# Patient Record
Sex: FEMALE | Race: WHITE | NOT HISPANIC OR LATINO | Employment: UNEMPLOYED | URBAN - METROPOLITAN AREA
[De-identification: names, ages, dates, MRNs, and addresses within clinical notes are randomized per-mention and may not be internally consistent; named-entity substitution may affect disease eponyms.]

---

## 2017-04-24 ENCOUNTER — OFFICE VISIT (OUTPATIENT)
Dept: URGENT CARE | Facility: CLINIC | Age: 37
End: 2017-04-24
Payer: COMMERCIAL

## 2017-04-24 ENCOUNTER — HOSPITAL ENCOUNTER (OUTPATIENT)
Dept: RADIOLOGY | Facility: HOSPITAL | Age: 37
Discharge: HOME | End: 2017-04-24
Attending: NURSE PRACTITIONER
Payer: COMMERCIAL

## 2017-04-24 VITALS
RESPIRATION RATE: 16 BRPM | DIASTOLIC BLOOD PRESSURE: 70 MMHG | TEMPERATURE: 99 F | SYSTOLIC BLOOD PRESSURE: 116 MMHG | HEIGHT: 63 IN | WEIGHT: 184.31 LBS | BODY MASS INDEX: 32.66 KG/M2 | HEART RATE: 96 BPM | OXYGEN SATURATION: 99 %

## 2017-04-24 DIAGNOSIS — S69.91XA HAND INJURY, RIGHT, INITIAL ENCOUNTER: Primary | ICD-10-CM

## 2017-04-24 DIAGNOSIS — S69.91XA HAND INJURY, RIGHT, INITIAL ENCOUNTER: ICD-10-CM

## 2017-04-24 PROCEDURE — 1160F RVW MEDS BY RX/DR IN RCRD: CPT | Mod: S$GLB,,, | Performed by: NURSE PRACTITIONER

## 2017-04-24 PROCEDURE — 73130 X-RAY EXAM OF HAND: CPT | Mod: 26,RT,, | Performed by: RADIOLOGY

## 2017-04-24 PROCEDURE — 99999 PR PBB SHADOW E&M-NEW PATIENT-LVL III: CPT | Mod: PBBFAC,,, | Performed by: NURSE PRACTITIONER

## 2017-04-24 PROCEDURE — 73130 X-RAY EXAM OF HAND: CPT | Mod: TC,PO,RT

## 2017-04-24 PROCEDURE — 99203 OFFICE O/P NEW LOW 30 MIN: CPT | Mod: S$GLB,,, | Performed by: NURSE PRACTITIONER

## 2017-04-24 NOTE — MR AVS SNAPSHOT
"    [facility]  [address] [place] [address]  Phone: [phone]  Fax: [phone]                  Vero Morrissey   2017 7:40 PM   Office Visit    Description:  Female : 1980   Provider:  DEISY Elaine   Department:  [facility]           Reason for Visit     Hand Injury           Diagnoses this Visit        Comments    Hand injury, right, initial encounter    -  Primary            To Do List           Goals (5 Years of Data)     None      [facility]     [facility] On Call Nurse Care Line -  Assistance  Unless otherwise directed by your provider, please contact [facility], our nurse care line that is available for  assistance.     Registered nurses in the [facility] provide: appointment scheduling, clinical advisement, health education, and other advisory services.  Call: [phone] (toll free)               Medications           Message regarding Medications     Verify the changes and/or additions to your medication regime listed below are the same as discussed with your clinician today.  If any of these changes or additions are incorrect, please notify your healthcare provider.             Verify that the below list of medications is an accurate representation of the medications you are currently taking.  If none reported, the list may be blank. If incorrect, please contact your healthcare provider. Carry this list with you in case of emergency.                Clinical Reference Information           Your Vitals Were     BP Pulse Temp Resp Height    116/70 (BP Location: Left arm, Patient Position: Sitting, BP Method: Manual) 96 98.5 °F (36.9 °C) (Tympanic) 16 5' 3" (1.6 m)    Weight Last Period SpO2 BMI    83.6 kg (184 lb 4.9 oz) 2017 99% 32.65 kg/m2      Blood Pressure          Most Recent Value    BP  116/70      Allergies as of 2017     No Known Allergies      Immunizations Administered on Date " of Encounter - 4/24/2017     None      Orders Placed During Today's Visit     Future Labs/Procedures Expected by Expires    X-Ray Hand 3 view Right  4/24/2017 4/24/2018      [facility] Sign-Up     Activating your [facility] account is as easy as 1-2-3!     1) Visit [email or web address], select Sign Up Now, enter this activation code and your date of birth, then select Next.  [email or web address]  Expires: 6/8/2017  7:51 PM      2) Create a username and password to use when you visit [facility] in the future and select a security question in case you lose your password and select Next.    3) Enter your e-mail address and click Sign Up!    Additional Information  If you have questions, please e-mail [email or web address] or call [phone] to talk to our [facility] staff. Remember, [facility] is NOT to be used for urgent needs. For medical emergencies, dial 911.         Instructions      RICE     Rest an injury, elevate it, and use ice and compression as directed.   RICE stands for rest, ice, compression, and elevation. These can limit pain and swelling after an injury. RICE may be recommended to help treat fractures, sprains, strains, and bruises or bumps.   Home care  The following explain the details of RICE:  · Rest. Limit the use of the injured body part. This helps prevent further damage to the body part and gives it time to heal. In some cases, you may need a sling, brace, splint, or cast to help keep the body part still until it has healed.  · Ice. Applying ice right after an injury helps relieve pain and swelling. Wrap a bag of ice in a thin towel. Then, place it over the injured area. Do this for 10 to 15 minutes every 3 to 4 hours. Continue for the next 1 to 3 days or until your symptoms improve. Never put ice directly on your skin or ice an area longer than 15 minutes at a time.  · Compression. Putting pressure on an injury helps reduce swelling and provides support. Wrap the injured area firmly with an elastic  bandage/wrap. Make sure not to wrap the bandage too tightly or you will cut off blood flow to the injured area. If your bandage loosens, rewrap it.  · Elevation. Keeping an injury raised above the level of your heart reduces swelling, pain, and throbbing. For instance, if you have a broken leg, it may help to rest your leg on several pillows when sitting or lying down. Try to keep the injured area elevated for at least 2 to 3 hours per day.  Follow-up care  Follow up with your healthcare provider, or as advised.  When to seek medical advice  Call your healthcare provider right away if any of these occur:  · Fever of 100.4°F (38°C) or higher, or as directed by your healthcare provider  · Increased pain or swelling in the injured body part  · Injured body part becomes cold, blue, numb, or tingly  · Signs of infection. These include warmth in the skin, redness, drainage, or bad smell coming from the injured body part.  Date Last Reviewed: 1/18/2016 © 2000-2016 [employer]. [address] [place] [address]. All rights reserved. This information is not intended as a substitute for professional medical care. Always follow your healthcare professional's instructions.        Wrist Sprain  A sprain is an injury to the ligaments or capsule that holds a joint together. There are no broken bones. Most sprains take about 3 to 6 weeks to heal. If it a severe sprain where the ligament is completely torn, it can take months to recover.    Most wrist sprains are treated with a splint, wrist brace, or elastic wrap for support. Severe sprains may require surgery.  Home care  · Keep your arm elevated to reduce pain and swelling. This is very important during the first 48 hours.  · Apply an ice pack over the injured area for 15 to 20 minutes every 3 to 6 hours. You should do this for the first 24 to 48 hours. You can make an ice pack by filling a plastic bag that seals at the top with ice cubes and then wrapping  it with a thin towel. Continue to use ice packs for relief of pain and swelling as needed. As the ice melts, be careful to avoid getting your wrap, splint, or cast wet. After 48 hours, apply heat (warm shower or warm bath) for 15 to 20 minutes several times a day, or alternate ice and heat.   · You may use over-the-counter pain medicine to control pain, unless another pain medicine was prescribed. If you have chronic liver or kidney disease or ever had a stomach ulcer or GI bleeding, talk with your doctor before using these medicines.  · If you were given a splint or brace, wear it for the time advised by your doctor.  Follow-up care  Follow up with your healthcare provider as advised. Any X-rays you had today dont show any broken bones, breaks, or fractures. Sometimes fractures dont show up on the first X-ray. Bruises and sprains can sometimes hurt as much as a fracture. These injuries can take time to heal completely. If your symptoms dont improve or they get worse, talk with your doctor. You may need a repeat X-ray. If X-rays were taken, you will be told of any new findings that may affect your care.  When to seek medical advice  Call your healthcare provider right away if any of these occur:  · Pain or swelling increases  · Fingers or hand becomes cold, blue, numb, or tingly  Date Last Reviewed: 11/20/2015  © 8388-5812 [employer]. [address] [place] [address]. All rights reserved. This information is not intended as a substitute for professional medical care. Always follow your healthcare professional's instructions.        Wrist Splint: Velcro  A splint is designed to prevent movement of the bones, muscles and tendons. Velcro wrist splints are used because of their comfort and convenience for wrist and hand injuries. In certain conditions, the splint can be removed when bathing or changing clothes. The condition you are being treated for will determine how long you should wear the  splint and if it is safe to remove your splint before your next visit. If you are unsure, ask your nurse or doctor.  When to seek medical advice  Call your healthcare provider right away if any of these occur:  · Increased pain or swelling under the splint or in the hand or fingers  · Fingers or hand becomes cold, blue, numb or tingly  Date Last Reviewed: 11/21/2015  © 3172-3662 [employer]. [address] [place] [address], [place] [address]. All rights reserved. This information is not intended as a substitute for professional medical care. Always follow your healthcare professional's instructions.             Smoking Cessation     If you would like to quit smoking:   You may be eligible for free services if you are a [place] resident and started smoking cigarettes before September 1, 1988.  Call the Smoking Cessation Trust ([facility]) toll free at (653) 644-3972 or (019) 468-1611.   Call 1-800-QUIT-NOW if you do not meet the above criteria.   Contact us via email: [email or web address]   View our website for more information: [email or web address]        Language Assistance Services     ATTENTION: Language assistance services are available, free of charge. Please call [phone].      ATENCIÓN: Si habla salañol, tiene a riggs disposición servicios gratuitos de asistencia lingüística. Llame al [phone].     CHÚ Ý: N?u b?n nói Ti?ng Vi?t, có các d?ch v? h? tr? ngôn ng? mi?n phí dành cho b?n. G?i s? [phone].         [facility] complies with applicable Federal civil rights laws and does not discriminate on the basis of race, color, national origin, age, disability, or sex.

## 2017-04-25 NOTE — PATIENT INSTRUCTIONS
RICE     Rest an injury, elevate it, and use ice and compression as directed.   RICE stands for rest, ice, compression, and elevation. These can limit pain and swelling after an injury. RICE may be recommended to help treat fractures, sprains, strains, and bruises or bumps.   Home care  The following explain the details of RICE:  · Rest. Limit the use of the injured body part. This helps prevent further damage to the body part and gives it time to heal. In some cases, you may need a sling, brace, splint, or cast to help keep the body part still until it has healed.  · Ice. Applying ice right after an injury helps relieve pain and swelling. Wrap a bag of ice in a thin towel. Then, place it over the injured area. Do this for 10 to 15 minutes every 3 to 4 hours. Continue for the next 1 to 3 days or until your symptoms improve. Never put ice directly on your skin or ice an area longer than 15 minutes at a time.  · Compression. Putting pressure on an injury helps reduce swelling and provides support. Wrap the injured area firmly with an elastic bandage/wrap. Make sure not to wrap the bandage too tightly or you will cut off blood flow to the injured area. If your bandage loosens, rewrap it.  · Elevation. Keeping an injury raised above the level of your heart reduces swelling, pain, and throbbing. For instance, if you have a broken leg, it may help to rest your leg on several pillows when sitting or lying down. Try to keep the injured area elevated for at least 2 to 3 hours per day.  Follow-up care  Follow up with your healthcare provider, or as advised.  When to seek medical advice  Call your healthcare provider right away if any of these occur:  · Fever of 100.4°F (38°C) or higher, or as directed by your healthcare provider  · Increased pain or swelling in the injured body part  · Injured body part becomes cold, blue, numb, or tingly  · Signs of infection. These include warmth in the skin, redness, drainage, or bad  smell coming from the injured body part.  Date Last Reviewed: 1/18/2016  © 0634-3669 [employer]. [address] [place] [address], [place] [address]. All rights reserved. This information is not intended as a substitute for professional medical care. Always follow your healthcare professional's instructions.        Wrist Sprain  A sprain is an injury to the ligaments or capsule that holds a joint together. There are no broken bones. Most sprains take about 3 to 6 weeks to heal. If it a severe sprain where the ligament is completely torn, it can take months to recover.    Most wrist sprains are treated with a splint, wrist brace, or elastic wrap for support. Severe sprains may require surgery.  Home care  · Keep your arm elevated to reduce pain and swelling. This is very important during the first 48 hours.  · Apply an ice pack over the injured area for 15 to 20 minutes every 3 to 6 hours. You should do this for the first 24 to 48 hours. You can make an ice pack by filling a plastic bag that seals at the top with ice cubes and then wrapping it with a thin towel. Continue to use ice packs for relief of pain and swelling as needed. As the ice melts, be careful to avoid getting your wrap, splint, or cast wet. After 48 hours, apply heat (warm shower or warm bath) for 15 to 20 minutes several times a day, or alternate ice and heat.   · You may use over-the-counter pain medicine to control pain, unless another pain medicine was prescribed. If you have chronic liver or kidney disease or ever had a stomach ulcer or GI bleeding, talk with your doctor before using these medicines.  · If you were given a splint or brace, wear it for the time advised by your doctor.  Follow-up care  Follow up with your healthcare provider as advised. Any X-rays you had today dont show any broken bones, breaks, or fractures. Sometimes fractures dont show up on the first X-ray. Bruises and sprains can sometimes hurt as much as a fracture.  These injuries can take time to heal completely. If your symptoms dont improve or they get worse, talk with your doctor. You may need a repeat X-ray. If X-rays were taken, you will be told of any new findings that may affect your care.  When to seek medical advice  Call your healthcare provider right away if any of these occur:  · Pain or swelling increases  · Fingers or hand becomes cold, blue, numb, or tingly  Date Last Reviewed: 11/20/2015 © 2000-2016 [employer]. [address] [place] [address]. All rights reserved. This information is not intended as a substitute for professional medical care. Always follow your healthcare professional's instructions.        Wrist Splint: Velcro  A splint is designed to prevent movement of the bones, muscles and tendons. Velcro wrist splints are used because of their comfort and convenience for wrist and hand injuries. In certain conditions, the splint can be removed when bathing or changing clothes. The condition you are being treated for will determine how long you should wear the splint and if it is safe to remove your splint before your next visit. If you are unsure, ask your nurse or doctor.  When to seek medical advice  Call your healthcare provider right away if any of these occur:  · Increased pain or swelling under the splint or in the hand or fingers  · Fingers or hand becomes cold, blue, numb or tingly  Date Last Reviewed: 11/21/2015 © 2000-2016 [employer]. [address] [place] [address]. All rights reserved. This information is not intended as a substitute for professional medical care. Always follow your healthcare professional's instructions.

## 2017-04-27 NOTE — PROGRESS NOTES
Subjective:       Patient ID: Vero Morrissey is a 36 y.o. female.    Chief Complaint: Hand Injury    Hand Injury    Her dominant hand is their right hand. The incident occurred 1 to 3 hours ago. The incident occurred in the yard. The injury mechanism was a fall. The pain is present in the right hand. The quality of the pain is described as aching. The pain does not radiate. The pain is at a severity of 5/10. The pain has been constant since the incident. Pertinent negatives include no numbness or tingling. The symptoms are aggravated by movement and palpation. She has tried NSAIDs for the symptoms. The treatment provided mild relief.     Review of Systems   Constitutional: Negative for fever.   Respiratory: Negative for shortness of breath.    Gastrointestinal: Negative for nausea and vomiting.   Musculoskeletal: Positive for joint swelling.   Skin: Negative for color change and rash.   Allergic/Immunologic: Negative for immunocompromised state.   Neurological: Negative for tingling and numbness.   Hematological: Does not bruise/bleed easily.       Objective:      Physical Exam   Constitutional: She is oriented to person, place, and time. She appears well-developed and well-nourished.   Eyes: Conjunctivae are normal.   Neck: Normal range of motion.   Cardiovascular: Normal rate.    Pulmonary/Chest: Effort normal. No respiratory distress.   Musculoskeletal: She exhibits edema and tenderness.        Right hand: She exhibits tenderness, bony tenderness and swelling. She exhibits normal range of motion, normal capillary refill and no laceration.   There is mild swelling below the right thumb. NV intact.   Neurological: She is alert and oriented to person, place, and time.   Skin: Skin is warm and dry.   Psychiatric: She has a normal mood and affect. Her behavior is normal.   Vitals reviewed.      Assessment:       1. Hand injury, right, initial encounter        Plan:   Vero was seen today for hand  injury.    Diagnoses and all orders for this visit:    Hand injury, right, initial encounter  -     X-Ray Hand 3 view Right; Future    Discussed PRICE therapy:  Protect the joint with stabilizing brace or taping  Rest the extremity  Ice as many times a day for 20 minute intervals for first 48 hours or until inflammation has stabilized   Compression to provide support and help prevent swelling  Elevation above heart level to help decrease swelling    For pain, may take Ibuprofen  X-ray looks normal but will call after radiology read.  If symptoms worsen or fail to improve, follow-up with primary care doctor or nearest ER. After visit summary given and discussed. Patient verbalized understanding and agrees with treatment plan. Patient remained stable and was discharged in no acute distress.

## 2021-04-29 ENCOUNTER — PATIENT MESSAGE (OUTPATIENT)
Dept: RESEARCH | Facility: HOSPITAL | Age: 41
End: 2021-04-29

## 2023-08-01 ENCOUNTER — OFFICE VISIT (OUTPATIENT)
Dept: PODIATRY | Facility: CLINIC | Age: 43
End: 2023-08-01
Payer: COMMERCIAL

## 2023-08-01 VITALS — HEIGHT: 63 IN | BODY MASS INDEX: 32.6 KG/M2 | WEIGHT: 184 LBS

## 2023-08-01 DIAGNOSIS — B07.0 PLANTAR WART OF LEFT FOOT: Primary | ICD-10-CM

## 2023-08-01 PROCEDURE — 3008F PR BODY MASS INDEX (BMI) DOCUMENTED: ICD-10-PCS | Mod: CPTII,S$GLB,, | Performed by: PODIATRIST

## 2023-08-01 PROCEDURE — 1159F PR MEDICATION LIST DOCUMENTED IN MEDICAL RECORD: ICD-10-PCS | Mod: CPTII,S$GLB,, | Performed by: PODIATRIST

## 2023-08-01 PROCEDURE — 3008F BODY MASS INDEX DOCD: CPT | Mod: CPTII,S$GLB,, | Performed by: PODIATRIST

## 2023-08-01 PROCEDURE — 1159F MED LIST DOCD IN RCRD: CPT | Mod: CPTII,S$GLB,, | Performed by: PODIATRIST

## 2023-08-01 PROCEDURE — 17000 PR DESTRUCTION(LASER SURGERY,CRYOSURGERY,CHEMOSURGERY),PREMALIGNANT LESIONS,FIRST LESION: ICD-10-PCS | Mod: S$GLB,,, | Performed by: PODIATRIST

## 2023-08-01 PROCEDURE — 17000 DESTRUCT PREMALG LESION: CPT | Mod: S$GLB,,, | Performed by: PODIATRIST

## 2023-08-01 PROCEDURE — 99204 OFFICE O/P NEW MOD 45 MIN: CPT | Mod: 25,S$GLB,, | Performed by: PODIATRIST

## 2023-08-01 PROCEDURE — 99999 PR PBB SHADOW E&M-EST. PATIENT-LVL III: ICD-10-PCS | Mod: PBBFAC,,, | Performed by: PODIATRIST

## 2023-08-01 PROCEDURE — 99204 PR OFFICE/OUTPT VISIT, NEW, LEVL IV, 45-59 MIN: ICD-10-PCS | Mod: 25,S$GLB,, | Performed by: PODIATRIST

## 2023-08-01 PROCEDURE — 99999 PR PBB SHADOW E&M-EST. PATIENT-LVL III: CPT | Mod: PBBFAC,,, | Performed by: PODIATRIST

## 2023-08-01 RX ORDER — DEXTROAMPHETAMINE SACCHARATE, AMPHETAMINE ASPARTATE, DEXTROAMPHETAMINE SULFATE AND AMPHETAMINE SULFATE 7.5; 7.5; 7.5; 7.5 MG/1; MG/1; MG/1; MG/1
1 TABLET ORAL 2 TIMES DAILY
COMMUNITY
Start: 2023-07-14

## 2023-08-01 RX ORDER — ZINC GLUCONATE 13.3 MG
200 LOZENGE ORAL 4 TIMES DAILY
Qty: 120 TABLET | Refills: 0 | Status: SHIPPED | OUTPATIENT
Start: 2023-08-01 | End: 2023-11-16

## 2023-08-01 RX ORDER — CLOTRIMAZOLE AND BETAMETHASONE DIPROPIONATE 10; .64 MG/G; MG/G
CREAM TOPICAL
COMMUNITY
Start: 2023-07-14 | End: 2023-11-16

## 2023-08-01 NOTE — PROGRESS NOTES
PODIATRIC MEDICINE AND SURGERY          CHIEF COMPLAINT   Chief Complaint   Patient presents with    Foot Problem     C/o plantar wart, left foot, has tried several different treatments, no improvement, x several years, rates pain 7/10, non-diabetic       HPI  Vero Morrissey is a 43 y.o. female who presents with complaints of painful lesion on bottom of left  foot. States that lesion has been present for several years. They have  tried multiple treatments in the past without much resolution.   Patient denies other pedal complaints at this time.      PMH  Past Medical History:   Diagnosis Date    ADD (attention deficit disorder)        PROBLEM LIST  There are no problems to display for this patient.      MEDS  Current Outpatient Medications on File Prior to Visit   Medication Sig Dispense Refill    clotrimazole-betamethasone 1-0.05% (LOTRISONE) cream Apply topically.      dextroamphetamine-amphetamine 30 mg Tab Take 1 tablet by mouth 2 (two) times daily.       No current facility-administered medications on file prior to visit.       PSH   History reviewed. No pertinent surgical history.     ALL  Review of patient's allergies indicates:  No Known Allergies    SOC     Social History     Tobacco Use    Smoking status: Every Day     Current packs/day: 0.00   Substance Use Topics    Alcohol use: No    Drug use: No         Family HX    Family History   Problem Relation Age of Onset    Hypertension Mother     Diabetes Mother     Hypertension Father     Diabetes Father     Cancer Brother         lymphoma          REVIEW OF SYSTEMS  General: Denies any fever or chills  Chest: Denies shortness of breath, wheezing, coughing, or sputum production  Heart: Denies chest pain, cold extremities, orthopenia, or reduced exercise tolerance  As noted above and per history of current illness above, otherwise negative in the remainder of the 14 systems.     PHYSICAL EXAM  Vitals:    08/01/23 1506   Weight: 83.5 kg (184 lb)  "  Height: 5' 3" (1.6 m)   PainSc:   7       General: This patient is well-developed, well-nourished and appears stated age, well-oriented to person, place and time, and cooperative and pleasant on today's visit        LOWER EXTREMITY PHYSICAL EXAM  VASCULAR  Dorsalis pedis and posterior tibial pulses palpable 2/4 bilaterally. Capillary refill time immediate to the toes. Feet are warm to the touch. Skin temperature warm to warm from proximally to distally There are no varicosities, telangiectasias noted to bilateral foot and ankle regions. There are no ecchymoses noted to bilateral foot and ankle regions. There is no gross lower extremity edema.    DERMATOLOGIC  Skin moist with healthy texture and turgor.There are no open ulcerations, lacerations, or fissures to bilateral foot and ankle regions. There are no signs of infection as there is no erythema, no proximal-extending lymphangiitis, no fluctuance, or crepitus noted on palpation to bilateral foot and ankle regions. There is no interdigital maceration.  Thickened hyperkeratotic tissue noted plantar left foot. Post debridement reveals loss of skin lines. There is punctate bleeding with debridement     NEUROLOGIC  Epicritic sensation is intact as the patient is able to sense light touch to bilateral foot and ankle regions. Achilles and patellar deep tendon reflexes intact. Babinski reflex absent    ORTHOPEDIC/BIOMECHANICAL  Mild pain on palpation of above noted lesion especially with side to side compression. Muscle strength AT/EHL/EDL/PT: 5/5; Achilles/Gastroc/Soleus: 5/5; PB/PL: 5/5 Muscle tone is normal. Ankle joint ROM non painful with DF/PF, non-crepitus; STJ ROM  Inversion/Eversion non painful, non crepitus ; MTPJ b/l  DF/PF, non crepitus     ASSESSMENT  1. Plantar verrucae, Left foot    Plantar wart of left foot    Other orders  -     cimetidine (TAGAMET) 200 MG tablet; Take 1 tablet (200 mg total) by mouth 4 (four) times daily.  Dispense: 120 tablet; Refill: " 0         PLAN    1. Patient was educated about clinical and imaging findings, and verbalizes understanding of above.  2. Treatment plan: Risks and benefits of the procedure were discussed with the patient.  Risks include pain, bleeding, scarring, loss of sensation (temporary or permanent), infection, anesthesia reaction, and need for further or repeat procedures.  Benefits of the procedure include resolution of the condition.  All questions were answered and the patient gave verbal consent to proceed.      With patient's permission, sharp excisional debridement and chemical destruction of soft tissue lesion deep to epidermal layer x 1 was performed with #15 blade. Patient relates relief following the procedure. Cantharidin applied to wart site. Silk tape applied for occlusion and then an offloading donut pad. Pt instructed to keep adhesive intact for 2-4 hours. Post operative care provided in AVS.      3. RTC  for follow up/evaluation in 3 weeks, repeat application as necessary, or sooner if any issues, questions or concerns.      Report Electronically Signed By:     Eve Noonan DPM   Podiatry  [facility]  8/1/2023

## 2023-08-01 NOTE — PATIENT INSTRUCTIONS
Plantar Wart Aftercare: CANTHARIDIN topical    You have just had a topical agent applied to your plantar wart(s) to help kill the virus.  The following instructions will optimize the outcome of your wart removal procedure today. Cantharidin is a blistering solution which causes redness, swelling and fluid accumulation under the lesion. It is a strong agent that will help kill the virus on your foot.      Cantharidin   1. Keep the treated areas covered with a bandage or tape if applied at your visit. This will prevent potentially getting the medication on any unwanted skin area or eyes. Also avoid getting the treated areas moist or wet as this may spread the cantharone to unwanted areas.     2. After 24 hours after  having cantharone applied in clinic, you should wash it off gently with a wash cloth that you can throw away after. Gently wash the area off and leave a small film over the treated area (s). Make sure to     If severe stinging or burning occurs before the 24 hours are up, it is okay to wash the area sooner.     3. If you do not remove the chemical further blistering can  occur. Blistering will start to form in about 3 to 8 hours post treatment for most people. Some people may be very sensitive to the agent and may experience a tingling or burning sensation or discomfort/pain at the treatment site(s). Tylenol or Advil may be taken for discomfort.       4. If blistering occurs, cleanse daily with an antibacterial soap and apply Polysporin or Vaseline ointment and a band-aid.     5.  If a severe reaction does occur (large blistering, intense redness, pus, swelling, or pain), please call the office.     6. Tomorrow, you may START TO APPLY SALICYLIC ACID (can be purchased over the counter near foot products at local pharmacy) to the wart treatment areas and cover with DUCT TAPE.  Perform this once daily until you return back to see Dr. Noonan.      In 1 to 2 weeks crusting, scabbing and peeling occur.   "Jacklynisauradavid will see you back in her office for follow up visit. Some people might require a second treatment if the virus still remains.      Plantar Warts    Contrary to the old folk tale, you can't get warts from touching a toad. Warts are non-cancerous skin growths caused by human papillomavirus (HPV). This virus is passed from person to person where it enters the body through breaks in the skin. The time between contact to developing lesions can be months or longer.   Some people are more likely to get warts than others.  This may be due to the portal of entry of the wart virus, such as in children who bite their nails or pick at hangnails.  Patients with weaker immune systems are also more likely to get warts.    Warts are usually skin-colored and rough to the touch.  There are several kinds of warts such as common, plantar (foot), and flat.  Plantar warts usually occur on the soles of the feet. The HPV virus grows in warm, moist environments, such as those created in a locker room or in your shoes when your feet perspire and the moisture is trapped.  Plantar warts will often spread to other areas of the foot, increase in size, and have "babies," resulting in a cluster that resembles a mosaic. Plantar warts can erupt anywhere on the sole of the foot. They may be difficult to distinguish from calluses. However, you may be able to see tiny black dots on the surface layer of a plantar wart. These are the ends of capillary blood vessels. .  Plantar warts can be very painful and tender. Standing and walking causes the wart to pinch the sensory nerves between the folds of skin which causes pain.    In children, warts can disappear without treatment over months to years.  In adults, this does not happen as easily or quickly.  Painful, rapidly multiplying warts should be treated.      Treatment  There are a variety of treatments for warts.  Most treatments aim to stimulate your bodys immune response to the lesion. The " wart can resolve without therapy in many cases.  Salacylic acid in a solution or plaster (available over-the-counter) applied daily may take weeks to months of treatment.  Cantharone (bettle juice extract) and/or cryotherapy (freezing the wart) are the most common treatments in the medical office setting.  Cantharone is typically more effective and requires fewer applications than cryotherapy. Both of these treatments produce blistering of the skin and help the body eliminate the wart.  Surgical intervention (cutting the wart out), injections, and immunotherapy are other office-based therapies used less commonly for warts.  Duct tape applied to warts continuously (removed as it peels and reapplied after washing the area) for 6-12 weeks can also be effective for treatment.      Prevention  To reduce your risk for getting plantar warts, be sure to wear shower thongs or sandals when you use a public locker room or shower. Use foot powders and change your socks frequently to keep the feet dry.      More information on Plantar Wart:  Plantar Wart (Verruca Plantaris)       What is a Plantar Wart?  A wart is a small growth on the skin that develops when the skin is infected by a virus. Warts can develop anywhere on the foot, but typically they appear on the bottom (plantar side) of the foot. Plantar warts most commonly occur in children, adolescents, and the elderly.  There are two types of plantar warts:  A solitary wart is a single wart. It often increases in size and may eventually multiply, forming additional satellite warts.   Mosaic warts are a cluster of several small warts growing closely together in one area. Mosaic warts are more difficult to treat than solitary warts.   Causes  Plantar warts are caused by direct contact with the human papilloma virus (HPV). This is the same virus that causes warts on other areas of the body.  Symptoms  The symptoms of a plantar wart may include:  Thickened skin. Often a  plantar wart resembles a callus because of its tough, thick tissue.   Pain. Walking and standing may be painful. Squeezing the sides of the wart may also cause pain.   Tiny black dots. These often appear on the surface of the wart. The dots are actually dried blood contained in the capillaries (tiny blood vessels).   Plantar warts grow deep into the skin. Usually this growth occurs slowly, with the wart starting small and becoming larger over time.  Diagnosis and Treatment  To diagnose a plantar wart, the foot and ankle surgeon will examine the patients foot and look for signs and symptoms of a wart.  Although plantar warts may eventually clear up on their own, most patients desire faster relief. The goal of treatment is to completely remove the wart.  The foot and ankle surgeon may use topical or oral treatments, laser therapy, cryotherapy (freezing), acid treatments, or surgery to remove the wart.  Regardless of the treatment approaches undertaken, it is important that the patient follow the surgeons instructions, including all home care and medication that has been prescribed, as well as follow-up visits with the surgeon. Warts may return, requiring further treatment.  If there is no response to treatment, further diagnostic evaluation may be necessary. In such cases, the surgeon can perform a biopsy to rule out other potential causes for the growth.  Although there are many folk remedies for warts, patients should be aware that these remain unproven and may be dangerous. Patients should never try to remove warts themselves. This can do more harm than good.  Copyright © 2011    [employer] ([employer]), All Rights Reserved.

## 2023-08-22 ENCOUNTER — OFFICE VISIT (OUTPATIENT)
Dept: PODIATRY | Facility: CLINIC | Age: 43
End: 2023-08-22
Payer: COMMERCIAL

## 2023-08-22 DIAGNOSIS — B07.0 PLANTAR WART OF LEFT FOOT: Primary | ICD-10-CM

## 2023-08-22 PROCEDURE — 99214 OFFICE O/P EST MOD 30 MIN: CPT | Mod: 25,S$GLB,, | Performed by: PODIATRIST

## 2023-08-22 PROCEDURE — 99214 PR OFFICE/OUTPT VISIT, EST, LEVL IV, 30-39 MIN: ICD-10-PCS | Mod: 25,S$GLB,, | Performed by: PODIATRIST

## 2023-08-22 PROCEDURE — 1159F PR MEDICATION LIST DOCUMENTED IN MEDICAL RECORD: ICD-10-PCS | Mod: CPTII,S$GLB,, | Performed by: PODIATRIST

## 2023-08-22 PROCEDURE — 17000 DESTRUCT PREMALG LESION: CPT | Mod: S$GLB,,, | Performed by: PODIATRIST

## 2023-08-22 PROCEDURE — 99999 PR PBB SHADOW E&M-EST. PATIENT-LVL II: CPT | Mod: PBBFAC,,, | Performed by: PODIATRIST

## 2023-08-22 PROCEDURE — 17000 PR DESTRUCTION(LASER SURGERY,CRYOSURGERY,CHEMOSURGERY),PREMALIGNANT LESIONS,FIRST LESION: ICD-10-PCS | Mod: S$GLB,,, | Performed by: PODIATRIST

## 2023-08-22 PROCEDURE — 99999 PR PBB SHADOW E&M-EST. PATIENT-LVL II: ICD-10-PCS | Mod: PBBFAC,,, | Performed by: PODIATRIST

## 2023-08-22 PROCEDURE — 1159F MED LIST DOCD IN RCRD: CPT | Mod: CPTII,S$GLB,, | Performed by: PODIATRIST

## 2023-08-22 NOTE — PROGRESS NOTES
PODIATRIC MEDICINE AND SURGERY          CHIEF COMPLAINT   Chief Complaint   Patient presents with    Plantar Warts     Left foot   Last PCP visit: couple years ago   Not a diabetic        HPI  Vero Morrissey is a 43 y.o. female who presents with complaints of painful lesion on bottom of left  foot. States that lesion has been present for several years. They have  tried multiple treatments in the past without much resolution.     Pt is here for three week follow up. Pain is still present plantar foot, but improved. She has been compliant with topical Compound W.   Patient denies other pedal complaints at this time.      PMH  Past Medical History:   Diagnosis Date    ADD (attention deficit disorder)        PROBLEM LIST  There are no problems to display for this patient.      MEDS  Current Outpatient Medications on File Prior to Visit   Medication Sig Dispense Refill    cimetidine (TAGAMET) 200 MG tablet Take 1 tablet (200 mg total) by mouth 4 (four) times daily. 120 tablet 0    clotrimazole-betamethasone 1-0.05% (LOTRISONE) cream Apply topically.      dextroamphetamine-amphetamine 30 mg Tab Take 1 tablet by mouth 2 (two) times daily.       No current facility-administered medications on file prior to visit.       PSH   No past surgical history on file.     ALL  Review of patient's allergies indicates:  No Known Allergies    SOC     Social History     Tobacco Use    Smoking status: Every Day     Current packs/day: 0.00   Substance Use Topics    Alcohol use: No    Drug use: No         Family HX    Family History   Problem Relation Age of Onset    Hypertension Mother     Diabetes Mother     Hypertension Father     Diabetes Father     Cancer Brother         lymphoma          REVIEW OF SYSTEMS  General: Denies any fever or chills  Chest: Denies shortness of breath, wheezing, coughing, or sputum production  Heart: Denies chest pain, cold extremities, orthopenia, or reduced exercise tolerance  As noted above and  per history of current illness above, otherwise negative in the remainder of the 14 systems.     PHYSICAL EXAM  There were no vitals filed for this visit.      General: This patient is well-developed, well-nourished and appears stated age, well-oriented to person, place and time, and cooperative and pleasant on today's visit        LOWER EXTREMITY PHYSICAL EXAM  VASCULAR  Dorsalis pedis and posterior tibial pulses palpable 2/4 bilaterally. Capillary refill time immediate to the toes. Feet are warm to the touch. Skin temperature warm to warm from proximally to distally There are no varicosities, telangiectasias noted to bilateral foot and ankle regions. There are no ecchymoses noted to bilateral foot and ankle regions. There is no gross lower extremity edema.    DERMATOLOGIC  Skin moist with healthy texture and turgor.There are no open ulcerations, lacerations, or fissures to bilateral foot and ankle regions. There are no signs of infection as there is no erythema, no proximal-extending lymphangiitis, no fluctuance, or crepitus noted on palpation to bilateral foot and ankle regions. There is no interdigital maceration.  Thickened hyperkeratotic tissue noted plantar left foot. Post debridement reveals loss of skin lines. There is punctate bleeding with debridement     NEUROLOGIC  Epicritic sensation is intact as the patient is able to sense light touch to bilateral foot and ankle regions. Achilles and patellar deep tendon reflexes intact. Babinski reflex absent    ORTHOPEDIC/BIOMECHANICAL  Mild pain on palpation of above noted lesion especially with side to side compression. Muscle strength AT/EHL/EDL/PT: 5/5; Achilles/Gastroc/Soleus: 5/5; PB/PL: 5/5 Muscle tone is normal. Ankle joint ROM non painful with DF/PF, non-crepitus; STJ ROM  Inversion/Eversion non painful, non crepitus ; MTPJ b/l  DF/PF, non crepitus     ASSESSMENT  1. Plantar verrucae, Left foot        PLAN    1. Patient was educated about clinical and  imaging findings, and verbalizes understanding of above.  2. Treatment plan: Risks and benefits of the procedure were discussed with the patient.  Risks include pain, bleeding, scarring, loss of sensation (temporary or permanent), infection, anesthesia reaction, and need for further or repeat procedures.  Benefits of the procedure include resolution of the condition.  All questions were answered and the patient gave verbal consent to proceed.      With patient's permission, sharp excisional debridement and chemical destruction of soft tissue lesion deep to epidermal layer x 1 was performed with #15 blade. Patient relates relief following the procedure. Cantharidin applied to wart site. Silk tape applied for occlusion and then an offloading donut pad. Pt instructed to keep adhesive intact for 2-4 hours. Post operative care provided in AVS.      3. RTC  for follow up/evaluation in 3 weeks, repeat application as necessary, or sooner if any issues, questions or concerns.      Report Electronically Signed By:     Eve Noonan DPM   Podiatry  [facility]  8/22/2023

## 2023-08-24 ENCOUNTER — PATIENT MESSAGE (OUTPATIENT)
Dept: PODIATRY | Facility: CLINIC | Age: 43
End: 2023-08-24
Payer: COMMERCIAL

## 2023-08-24 RX ORDER — AMOXICILLIN AND CLAVULANATE POTASSIUM 875; 125 MG/1; MG/1
1 TABLET, FILM COATED ORAL EVERY 12 HOURS
Qty: 20 TABLET | Refills: 0 | Status: SHIPPED | OUTPATIENT
Start: 2023-08-24 | End: 2023-09-03

## 2023-08-30 ENCOUNTER — OFFICE VISIT (OUTPATIENT)
Dept: INTERNAL MEDICINE | Facility: CLINIC | Age: 43
End: 2023-08-30
Payer: COMMERCIAL

## 2023-08-30 ENCOUNTER — LAB VISIT (OUTPATIENT)
Dept: LAB | Facility: HOSPITAL | Age: 43
End: 2023-08-30
Attending: FAMILY MEDICINE
Payer: COMMERCIAL

## 2023-08-30 VITALS
BODY MASS INDEX: 32.61 KG/M2 | HEIGHT: 63 IN | OXYGEN SATURATION: 97 % | TEMPERATURE: 98 F | SYSTOLIC BLOOD PRESSURE: 112 MMHG | WEIGHT: 184.06 LBS | DIASTOLIC BLOOD PRESSURE: 86 MMHG | HEART RATE: 80 BPM

## 2023-08-30 DIAGNOSIS — Z71.6 ENCOUNTER FOR SMOKING CESSATION COUNSELING: ICD-10-CM

## 2023-08-30 DIAGNOSIS — F90.9 ADULT ADHD: Chronic | ICD-10-CM

## 2023-08-30 DIAGNOSIS — K59.09 CHRONIC CONSTIPATION: Chronic | ICD-10-CM

## 2023-08-30 DIAGNOSIS — Z00.00 ROUTINE GENERAL MEDICAL EXAMINATION AT A HEALTH CARE FACILITY: Primary | ICD-10-CM

## 2023-08-30 DIAGNOSIS — E66.09 CLASS 1 OBESITY DUE TO EXCESS CALORIES WITHOUT SERIOUS COMORBIDITY WITH BODY MASS INDEX (BMI) OF 32.0 TO 32.9 IN ADULT: ICD-10-CM

## 2023-08-30 DIAGNOSIS — Z72.0 TOBACCO USE: Chronic | ICD-10-CM

## 2023-08-30 DIAGNOSIS — Z00.00 ROUTINE GENERAL MEDICAL EXAMINATION AT A HEALTH CARE FACILITY: ICD-10-CM

## 2023-08-30 LAB
ALBUMIN SERPL BCP-MCNC: 3.7 G/DL (ref 3.5–5.2)
ALP SERPL-CCNC: 52 U/L (ref 55–135)
ALT SERPL W/O P-5'-P-CCNC: 9 U/L (ref 10–44)
ANION GAP SERPL CALC-SCNC: 12 MMOL/L (ref 8–16)
AST SERPL-CCNC: 16 U/L (ref 10–40)
BASOPHILS # BLD AUTO: 0.04 K/UL (ref 0–0.2)
BASOPHILS NFR BLD: 0.3 % (ref 0–1.9)
BILIRUB SERPL-MCNC: 0.3 MG/DL (ref 0.1–1)
BUN SERPL-MCNC: 7 MG/DL (ref 6–20)
CALCIUM SERPL-MCNC: 9.7 MG/DL (ref 8.7–10.5)
CHLORIDE SERPL-SCNC: 107 MMOL/L (ref 95–110)
CHOLEST SERPL-MCNC: 206 MG/DL (ref 120–199)
CHOLEST/HDLC SERPL: 4.4 {RATIO} (ref 2–5)
CO2 SERPL-SCNC: 22 MMOL/L (ref 23–29)
CREAT SERPL-MCNC: 0.7 MG/DL (ref 0.5–1.4)
DIFFERENTIAL METHOD: ABNORMAL
EOSINOPHIL # BLD AUTO: 0.1 K/UL (ref 0–0.5)
EOSINOPHIL NFR BLD: 0.8 % (ref 0–8)
ERYTHROCYTE [DISTWIDTH] IN BLOOD BY AUTOMATED COUNT: 13.2 % (ref 11.5–14.5)
EST. GFR  (NO RACE VARIABLE): >60 ML/MIN/1.73 M^2
GLUCOSE SERPL-MCNC: 70 MG/DL (ref 70–110)
HCT VFR BLD AUTO: 42.9 % (ref 37–48.5)
HDLC SERPL-MCNC: 47 MG/DL (ref 40–75)
HDLC SERPL: 22.8 % (ref 20–50)
HGB BLD-MCNC: 14.2 G/DL (ref 12–16)
IMM GRANULOCYTES # BLD AUTO: 0.06 K/UL (ref 0–0.04)
IMM GRANULOCYTES NFR BLD AUTO: 0.5 % (ref 0–0.5)
LDLC SERPL CALC-MCNC: 134.6 MG/DL (ref 63–159)
LYMPHOCYTES # BLD AUTO: 2.7 K/UL (ref 1–4.8)
LYMPHOCYTES NFR BLD: 21 % (ref 18–48)
MCH RBC QN AUTO: 31.8 PG (ref 27–31)
MCHC RBC AUTO-ENTMCNC: 33.1 G/DL (ref 32–36)
MCV RBC AUTO: 96 FL (ref 82–98)
MONOCYTES # BLD AUTO: 0.6 K/UL (ref 0.3–1)
MONOCYTES NFR BLD: 4.6 % (ref 4–15)
NEUTROPHILS # BLD AUTO: 9.5 K/UL (ref 1.8–7.7)
NEUTROPHILS NFR BLD: 72.8 % (ref 38–73)
NONHDLC SERPL-MCNC: 159 MG/DL
NRBC BLD-RTO: 0 /100 WBC
PLATELET # BLD AUTO: 443 K/UL (ref 150–450)
PMV BLD AUTO: 9.4 FL (ref 9.2–12.9)
POTASSIUM SERPL-SCNC: 3.9 MMOL/L (ref 3.5–5.1)
PROT SERPL-MCNC: 6.4 G/DL (ref 6–8.4)
RBC # BLD AUTO: 4.47 M/UL (ref 4–5.4)
SODIUM SERPL-SCNC: 141 MMOL/L (ref 136–145)
TRIGL SERPL-MCNC: 122 MG/DL (ref 30–150)
TSH SERPL DL<=0.005 MIU/L-ACNC: 0.94 UIU/ML (ref 0.4–4)
WBC # BLD AUTO: 12.99 K/UL (ref 3.9–12.7)

## 2023-08-30 PROCEDURE — 3074F SYST BP LT 130 MM HG: CPT | Mod: CPTII,S$GLB,, | Performed by: FAMILY MEDICINE

## 2023-08-30 PROCEDURE — 3079F DIAST BP 80-89 MM HG: CPT | Mod: CPTII,S$GLB,, | Performed by: FAMILY MEDICINE

## 2023-08-30 PROCEDURE — 1160F RVW MEDS BY RX/DR IN RCRD: CPT | Mod: CPTII,S$GLB,, | Performed by: FAMILY MEDICINE

## 2023-08-30 PROCEDURE — 99386 PREV VISIT NEW AGE 40-64: CPT | Mod: S$PBB,,, | Performed by: FAMILY MEDICINE

## 2023-08-30 PROCEDURE — 99999 PR PBB SHADOW E&M-EST. PATIENT-LVL V: CPT | Mod: PBBFAC,,,

## 2023-08-30 PROCEDURE — 80053 COMPREHEN METABOLIC PANEL: CPT | Performed by: FAMILY MEDICINE

## 2023-08-30 PROCEDURE — 3008F BODY MASS INDEX DOCD: CPT | Mod: CPTII,S$GLB,, | Performed by: FAMILY MEDICINE

## 2023-08-30 PROCEDURE — 99999 PR PBB SHADOW E&M-EST. PATIENT-LVL V: ICD-10-PCS | Mod: PBBFAC,,,

## 2023-08-30 PROCEDURE — 36415 COLL VENOUS BLD VENIPUNCTURE: CPT | Mod: PO | Performed by: FAMILY MEDICINE

## 2023-08-30 PROCEDURE — 3079F PR MOST RECENT DIASTOLIC BLOOD PRESSURE 80-89 MM HG: ICD-10-PCS | Mod: CPTII,S$GLB,, | Performed by: FAMILY MEDICINE

## 2023-08-30 PROCEDURE — 1160F PR REVIEW ALL MEDS BY PRESCRIBER/CLIN PHARMACIST DOCUMENTED: ICD-10-PCS | Mod: CPTII,S$GLB,, | Performed by: FAMILY MEDICINE

## 2023-08-30 PROCEDURE — 3008F PR BODY MASS INDEX (BMI) DOCUMENTED: ICD-10-PCS | Mod: CPTII,S$GLB,, | Performed by: FAMILY MEDICINE

## 2023-08-30 PROCEDURE — 99386 PR PREVENTIVE VISIT,NEW,40-64: ICD-10-PCS | Mod: S$PBB,,, | Performed by: FAMILY MEDICINE

## 2023-08-30 PROCEDURE — 1159F PR MEDICATION LIST DOCUMENTED IN MEDICAL RECORD: ICD-10-PCS | Mod: CPTII,S$GLB,, | Performed by: FAMILY MEDICINE

## 2023-08-30 PROCEDURE — 1159F MED LIST DOCD IN RCRD: CPT | Mod: CPTII,S$GLB,, | Performed by: FAMILY MEDICINE

## 2023-08-30 PROCEDURE — 3074F PR MOST RECENT SYSTOLIC BLOOD PRESSURE < 130 MM HG: ICD-10-PCS | Mod: CPTII,S$GLB,, | Performed by: FAMILY MEDICINE

## 2023-08-30 PROCEDURE — 80061 LIPID PANEL: CPT | Performed by: FAMILY MEDICINE

## 2023-08-30 PROCEDURE — 84443 ASSAY THYROID STIM HORMONE: CPT | Performed by: FAMILY MEDICINE

## 2023-08-30 PROCEDURE — 85025 COMPLETE CBC W/AUTO DIFF WBC: CPT | Performed by: FAMILY MEDICINE

## 2023-08-30 RX ORDER — BUPROPION HYDROCHLORIDE 150 MG/1
150 TABLET ORAL DAILY
Qty: 30 TABLET | Refills: 6 | Status: SHIPPED | OUTPATIENT
Start: 2023-08-30

## 2023-08-30 NOTE — PROGRESS NOTES
Subjective     Patient ID: Vero Morrissey is a 43 y.o. female.    Chief Complaint: [facility]    42 yo female who presents to [facility] and for annual wellness. Patient reports only history of ADHD and chronic constipation. Patient states she saw GI in the past and was instructed to eat a high fiber diet. Patient states that has not worked as well as she is currently on prescription for Linzess 290 mcg. Patient states she recently saw Marie Ratliff for pap and mammogram and is undergoing eval for uterine ablation.  Patient undergoing treatment for wart on her foot by podiatry    Review of Systems   Constitutional: Negative.    HENT: Negative.     Eyes:  Negative for discharge and redness.   Respiratory: Negative.     Cardiovascular: Negative.  Negative for chest pain, palpitations and leg swelling.   Gastrointestinal:  Positive for constipation. Negative for diarrhea.   Musculoskeletal: Negative.  Negative for arthralgias and gait problem.   Neurological: Negative.  Negative for coordination difficulties and coordination difficulties.   Psychiatric/Behavioral: Negative.            Objective     Physical Exam  Vitals and nursing note reviewed.   Constitutional:       Appearance: Normal appearance. She is normal weight.   HENT:      Head: Normocephalic and atraumatic.      Right Ear: Ear canal and external ear normal. Tympanic membrane is scarred.      Left Ear: Ear canal and external ear normal. Tympanic membrane is scarred.      Nose: Nose normal.      Mouth/Throat:      Mouth: Mucous membranes are moist.      Pharynx: Oropharynx is clear.   Eyes:      Extraocular Movements: Extraocular movements intact.      Conjunctiva/sclera: Conjunctivae normal.      Pupils: Pupils are equal, round, and reactive to light.   Cardiovascular:      Rate and Rhythm: Normal rate and regular rhythm.      Pulses: Normal pulses.      Heart sounds: Normal heart sounds.   Pulmonary:      Effort: Pulmonary effort is normal.       Breath sounds: Normal breath sounds.   Abdominal:      General: Abdomen is flat. Bowel sounds are normal.      Palpations: Abdomen is soft.   Musculoskeletal:         General: Normal range of motion.      Cervical back: Normal range of motion and neck supple.   Skin:     General: Skin is warm and dry.   Neurological:      General: No focal deficit present.      Mental Status: She is alert and oriented to person, place, and time.   Psychiatric:         Mood and Affect: Mood normal.         Behavior: Behavior normal.        Assessment and Plan     1. Routine general medical examination at a health care facility  Comments:  reviewed recommended screenings and vaccinations for the patient. Patient refusing covid and flu today.   Orders:  -     CBC W/ AUTO DIFFERENTIAL; Future; Expected date: 08/30/2023  -     COMPREHENSIVE METABOLIC PANEL; Future; Expected date: 08/30/2023  -     TSH; Future; Expected date: 08/30/2023  -     LIPID PANEL; Future; Expected date: 08/30/2023    2. Chronic constipation  Comments:  referral to gi for consideration of c-scope and new recommendations for treatment  Orders:  -     TSH; Future; Expected date: 08/30/2023  -     Ambulatory referral/consult to Gastroenterology; Future; Expected date: 09/06/2023    3. Tobacco use  Comments:  patient is interested in smoking cessation, referral sent  Orders:  -     Ambulatory referral/consult to Smoking Cessation Program; Future; Expected date: 09/06/2023  -     buPROPion (WELLBUTRIN XL) 150 MG TB24 tablet; Take 1 tablet (150 mg total) by mouth once daily.  Dispense: 30 tablet; Refill: 6    4. Encounter for smoking cessation counseling  Comments:  spent 5 min counseling patient on program and treatments available  Orders:  -     buPROPion (WELLBUTRIN XL) 150 MG TB24 tablet; Take 1 tablet (150 mg total) by mouth once daily.  Dispense: 30 tablet; Refill: 6    5. Class 1 obesity due to excess calories without serious comorbidity with body mass index  (BMI) of 32.0 to 32.9 in adult  Comments:  recommend reduced calorie and increase physical activity  Orders:  -     HEMOGLOBIN A1C; Future; Expected date: 08/30/2023    6. Adult ADHD  Comments:  on adderall treatment stable               Follow up in about 6 months (around 2/29/2024).

## 2023-09-05 DIAGNOSIS — D72.829 LEUKOCYTOSIS, UNSPECIFIED TYPE: Primary | ICD-10-CM

## 2023-09-12 ENCOUNTER — OFFICE VISIT (OUTPATIENT)
Dept: PODIATRY | Facility: CLINIC | Age: 43
End: 2023-09-12
Payer: COMMERCIAL

## 2023-09-12 VITALS — BODY MASS INDEX: 32.6 KG/M2 | WEIGHT: 184 LBS | HEIGHT: 63 IN

## 2023-09-12 DIAGNOSIS — B07.0 PLANTAR WART OF LEFT FOOT: Primary | ICD-10-CM

## 2023-09-12 PROCEDURE — 99999 PR PBB SHADOW E&M-EST. PATIENT-LVL III: ICD-10-PCS | Mod: PBBFAC,,, | Performed by: PODIATRIST

## 2023-09-12 PROCEDURE — 1159F MED LIST DOCD IN RCRD: CPT | Mod: CPTII,S$GLB,, | Performed by: PODIATRIST

## 2023-09-12 PROCEDURE — 99213 PR OFFICE/OUTPT VISIT, EST, LEVL III, 20-29 MIN: ICD-10-PCS | Mod: 25,S$GLB,, | Performed by: PODIATRIST

## 2023-09-12 PROCEDURE — 3008F BODY MASS INDEX DOCD: CPT | Mod: CPTII,S$GLB,, | Performed by: PODIATRIST

## 2023-09-12 PROCEDURE — 17000 PR DESTRUCTION(LASER SURGERY,CRYOSURGERY,CHEMOSURGERY),PREMALIGNANT LESIONS,FIRST LESION: ICD-10-PCS | Mod: S$GLB,,, | Performed by: PODIATRIST

## 2023-09-12 PROCEDURE — 17000 DESTRUCT PREMALG LESION: CPT | Mod: S$GLB,,, | Performed by: PODIATRIST

## 2023-09-12 PROCEDURE — 99213 OFFICE O/P EST LOW 20 MIN: CPT | Mod: 25,S$GLB,, | Performed by: PODIATRIST

## 2023-09-12 PROCEDURE — 3008F PR BODY MASS INDEX (BMI) DOCUMENTED: ICD-10-PCS | Mod: CPTII,S$GLB,, | Performed by: PODIATRIST

## 2023-09-12 PROCEDURE — 99999 PR PBB SHADOW E&M-EST. PATIENT-LVL III: CPT | Mod: PBBFAC,,, | Performed by: PODIATRIST

## 2023-09-12 PROCEDURE — 1159F PR MEDICATION LIST DOCUMENTED IN MEDICAL RECORD: ICD-10-PCS | Mod: CPTII,S$GLB,, | Performed by: PODIATRIST

## 2023-09-12 NOTE — PROGRESS NOTES
PODIATRIC MEDICINE AND SURGERY          CHIEF COMPLAINT   Chief Complaint   Patient presents with    Follow-up     3 week f/u for wart, sub 5th met head, 0 pain, non-diabetic, wears tennis and socks       HPI  Vero Morrissey is a 43 y.o. female who presents with complaints of painful lesion on bottom of left  foot. States that lesion has been present for several years. They have  tried multiple treatments in the past without much resolution.     Pt is here for three week follow up. She is s/p chemical debridement of wart. She notes 95% improvement in symptoms. Patient denies other pedal complaints at this time.      PMH  Past Medical History:   Diagnosis Date    ADD (attention deficit disorder)        PROBLEM LIST  Patient Active Problem List    Diagnosis Date Noted    Chronic constipation 08/30/2023    Tobacco use 08/30/2023       MEDS  Current Outpatient Medications on File Prior to Visit   Medication Sig Dispense Refill    buPROPion (WELLBUTRIN XL) 150 MG TB24 tablet Take 1 tablet (150 mg total) by mouth once daily. 30 tablet 6    cimetidine (TAGAMET) 200 MG tablet Take 1 tablet (200 mg total) by mouth 4 (four) times daily. 120 tablet 0    clotrimazole-betamethasone 1-0.05% (LOTRISONE) cream Apply topically.      dextroamphetamine-amphetamine 30 mg Tab Take 1 tablet by mouth 2 (two) times daily.       No current facility-administered medications on file prior to visit.       [facility]     Past Surgical History:   Procedure Laterality Date    tennis elbow surgery Left     tubligation Bilateral 2022        ALL  Review of patient's allergies indicates:  No Known Allergies    SOC     Social History     Tobacco Use    Smoking status: Every Day     Types: Cigarettes     Passive exposure: Never   Substance Use Topics    Alcohol use: Yes     Comment: socially    Drug use: No         Family HX    Family History   Problem Relation Age of Onset    Hypertension Mother     Diabetes Mother     Hypertension Father      "Diabetes Father     Cancer Brother         lymphoma          REVIEW OF SYSTEMS  General: Denies any fever or chills  Chest: Denies shortness of breath, wheezing, coughing, or sputum production  Heart: Denies chest pain, cold extremities, orthopenia, or reduced exercise tolerance  As noted above and per history of current illness above, otherwise negative in the remainder of the 14 systems.     PHYSICAL EXAM  Vitals:    09/12/23 1059   Weight: 83.5 kg (184 lb)   Height: 5' 3" (1.6 m)   PainSc: 0-No pain         General: This patient is well-developed, well-nourished and appears stated age, well-oriented to person, place and time, and cooperative and pleasant on today's visit        LOWER EXTREMITY PHYSICAL EXAM  VASCULAR  Dorsalis pedis and posterior tibial pulses palpable 2/4 bilaterally. Capillary refill time immediate to the toes. Feet are warm to the touch. Skin temperature warm to warm from proximally to distally There are no varicosities, telangiectasias noted to bilateral foot and ankle regions. There are no ecchymoses noted to bilateral foot and ankle regions. There is no gross lower extremity edema.    DERMATOLOGIC  Skin moist with healthy texture and turgor.There are no open ulcerations, lacerations, or fissures to bilateral foot and ankle regions. There are no signs of infection as there is no erythema, no proximal-extending lymphangiitis, no fluctuance, or crepitus noted on palpation to bilateral foot and ankle regions. There is no interdigital maceration.  Thickened hyperkeratotic tissue noted plantar left foot. Post debridement reveals loss of skin lines. There is punctate bleeding with debridement     NEUROLOGIC  Epicritic sensation is intact as the patient is able to sense light touch to bilateral foot and ankle regions. Achilles and patellar deep tendon reflexes intact. Babinski reflex absent    ORTHOPEDIC/BIOMECHANICAL  Mild pain on palpation of above noted lesion especially with side to side " compression. Muscle strength AT/EHL/EDL/PT: 5/5; Achilles/Gastroc/Soleus: 5/5; PB/PL: 5/5 Muscle tone is normal. Ankle joint ROM non painful with DF/PF, non-crepitus; STJ ROM  Inversion/Eversion non painful, non crepitus ; MTPJ b/l  DF/PF, non crepitus     ASSESSMENT  1. Plantar verrucae, Left foot        PLAN    1. Patient was educated about clinical and imaging findings, and verbalizes understanding of above.  2. Treatment plan: Risks and benefits of the procedure were discussed with the patient.  Risks include pain, bleeding, scarring, loss of sensation (temporary or permanent), infection, anesthesia reaction, and need for further or repeat procedures.  Benefits of the procedure include resolution of the condition.  All questions were answered and the patient gave verbal consent to proceed.      With patient's permission, sharp excisional debridement and chemical destruction of soft tissue lesion deep to epidermal layer x 1 was performed with #15 blade. Patient relates relief following the procedure. Cantharidin applied to wart site. Silk tape applied for occlusion and then an offloading donut pad. Pt instructed to keep adhesive intact for 2-4 hours. Post operative care provided in AVS.      3. RTC  for follow up/evaluation in 3 weeks, repeat application as necessary, or sooner if any issues, questions or concerns.      Report Electronically Signed By:     Eve Noonan DPM   Podiatry  [facility]  9/12/2023

## 2023-09-18 ENCOUNTER — CLINICAL SUPPORT (OUTPATIENT)
Dept: SMOKING CESSATION | Facility: CLINIC | Age: 43
End: 2023-09-18

## 2023-09-18 DIAGNOSIS — F17.200 NICOTINE DEPENDENCE: Primary | ICD-10-CM

## 2023-09-18 PROCEDURE — 99999 PR PBB SHADOW E&M-EST. PATIENT-LVL I: ICD-10-PCS | Mod: PBBFAC,,,

## 2023-09-18 PROCEDURE — 99404 PR PREVENT COUNSEL,INDIV,60 MIN: ICD-10-PCS | Mod: S$GLB,,, | Performed by: GENERAL PRACTICE

## 2023-09-18 PROCEDURE — 99999 PR PBB SHADOW E&M-EST. PATIENT-LVL I: CPT | Mod: PBBFAC,,,

## 2023-09-18 PROCEDURE — 99404 PREV MED CNSL INDIV APPRX 60: CPT | Mod: S$GLB,,, | Performed by: GENERAL PRACTICE

## 2023-09-18 RX ORDER — MICONAZOLE NITRATE 2 %
2 CREAM (GRAM) TOPICAL
Qty: 50 EACH | Refills: 0 | Status: SHIPPED | OUTPATIENT
Start: 2023-09-18

## 2023-09-18 RX ORDER — IBUPROFEN 200 MG
1 TABLET ORAL DAILY
Qty: 28 PATCH | Refills: 0 | Status: SHIPPED | OUTPATIENT
Start: 2023-09-18

## 2023-09-18 NOTE — PROGRESS NOTES
Patient seen today as a new intake for smoking cessation. Patient is a 1 ppd smoker. CO= 21 ppm Last cigarette 45 minutes ago.

## 2023-09-25 ENCOUNTER — CLINICAL SUPPORT (OUTPATIENT)
Dept: SMOKING CESSATION | Facility: CLINIC | Age: 43
End: 2023-09-25

## 2023-09-25 DIAGNOSIS — F17.200 NICOTINE DEPENDENCE: Primary | ICD-10-CM

## 2023-09-25 PROCEDURE — 99407 BEHAV CHNG SMOKING > 10 MIN: CPT | Mod: S$GLB,,, | Performed by: GENERAL PRACTICE

## 2023-09-25 PROCEDURE — 99407 PR TOBACCO USE CESSATION INTENSIVE >10 MINUTES: ICD-10-PCS | Mod: S$GLB,,, | Performed by: GENERAL PRACTICE

## 2023-10-09 ENCOUNTER — TELEPHONE (OUTPATIENT)
Dept: SMOKING CESSATION | Facility: CLINIC | Age: 43
End: 2023-10-09
Payer: COMMERCIAL

## 2023-11-07 ENCOUNTER — TELEPHONE (OUTPATIENT)
Dept: SMOKING CESSATION | Facility: CLINIC | Age: 43
End: 2023-11-07
Payer: COMMERCIAL

## 2023-11-16 ENCOUNTER — OFFICE VISIT (OUTPATIENT)
Dept: GASTROENTEROLOGY | Facility: CLINIC | Age: 43
End: 2023-11-16
Payer: COMMERCIAL

## 2023-11-16 VITALS
WEIGHT: 188.5 LBS | HEIGHT: 63 IN | DIASTOLIC BLOOD PRESSURE: 83 MMHG | SYSTOLIC BLOOD PRESSURE: 144 MMHG | HEART RATE: 93 BPM | BODY MASS INDEX: 33.4 KG/M2

## 2023-11-16 DIAGNOSIS — R14.0 BLOATING: Primary | ICD-10-CM

## 2023-11-16 DIAGNOSIS — K59.09 CHRONIC CONSTIPATION: Chronic | ICD-10-CM

## 2023-11-16 DIAGNOSIS — R10.84 GENERALIZED ABDOMINAL PAIN: ICD-10-CM

## 2023-11-16 PROCEDURE — 1159F PR MEDICATION LIST DOCUMENTED IN MEDICAL RECORD: ICD-10-PCS | Mod: CPTII,S$GLB,, | Performed by: NURSE PRACTITIONER

## 2023-11-16 PROCEDURE — 3008F PR BODY MASS INDEX (BMI) DOCUMENTED: ICD-10-PCS | Mod: CPTII,S$GLB,, | Performed by: NURSE PRACTITIONER

## 2023-11-16 PROCEDURE — 3008F BODY MASS INDEX DOCD: CPT | Mod: CPTII,S$GLB,, | Performed by: NURSE PRACTITIONER

## 2023-11-16 PROCEDURE — 3077F PR MOST RECENT SYSTOLIC BLOOD PRESSURE >= 140 MM HG: ICD-10-PCS | Mod: CPTII,S$GLB,, | Performed by: NURSE PRACTITIONER

## 2023-11-16 PROCEDURE — 99999 PR PBB SHADOW E&M-EST. PATIENT-LVL IV: CPT | Mod: PBBFAC,,, | Performed by: NURSE PRACTITIONER

## 2023-11-16 PROCEDURE — 3079F PR MOST RECENT DIASTOLIC BLOOD PRESSURE 80-89 MM HG: ICD-10-PCS | Mod: CPTII,S$GLB,, | Performed by: NURSE PRACTITIONER

## 2023-11-16 PROCEDURE — 1159F MED LIST DOCD IN RCRD: CPT | Mod: CPTII,S$GLB,, | Performed by: NURSE PRACTITIONER

## 2023-11-16 PROCEDURE — 99204 OFFICE O/P NEW MOD 45 MIN: CPT | Mod: S$GLB,,, | Performed by: NURSE PRACTITIONER

## 2023-11-16 PROCEDURE — 99999 PR PBB SHADOW E&M-EST. PATIENT-LVL IV: ICD-10-PCS | Mod: PBBFAC,,, | Performed by: NURSE PRACTITIONER

## 2023-11-16 PROCEDURE — 3077F SYST BP >= 140 MM HG: CPT | Mod: CPTII,S$GLB,, | Performed by: NURSE PRACTITIONER

## 2023-11-16 PROCEDURE — 99204 PR OFFICE/OUTPT VISIT, NEW, LEVL IV, 45-59 MIN: ICD-10-PCS | Mod: S$GLB,,, | Performed by: NURSE PRACTITIONER

## 2023-11-16 PROCEDURE — 3079F DIAST BP 80-89 MM HG: CPT | Mod: CPTII,S$GLB,, | Performed by: NURSE PRACTITIONER

## 2023-11-16 NOTE — PROGRESS NOTES
Clinic Consult:  [facility] Consultation Note    Reason for Consult:  The primary encounter diagnosis was Bloating. Diagnoses of Chronic constipation and Generalized abdominal pain were also pertinent to this visit.    PCP: Thelma Eddy   [id number] [facility] / [place] [id number]    HPI:  This is a 43 y.o. female here for evaluation of the above  Pt reports a long history of chronic constipation with associated abdominal pain and bloating.   She has tried multiple OTC medications, including stool softeners, laxative and suppositories  Has tried Linzess as well but did not tolerate due to severity of diarrhea and urgency  She reports that the abdominal pain has continued to worsen and she is requesting a diagnostic colonoscopy for evaluation.   No melena or hematochezia.     Review of Systems   Constitutional:  Negative for chills, fever, malaise/fatigue and weight loss.   Respiratory:  Negative for cough.    Cardiovascular:  Negative for chest pain.   Gastrointestinal:         Per HPI   Musculoskeletal:  Negative for myalgias.   Skin:  Negative for itching and rash.   Neurological:  Negative for headaches.   Psychiatric/Behavioral:  The patient is not nervous/anxious.        Medical History:   Past Medical History:   Diagnosis Date    ADD (attention deficit disorder)        Surgical History:  Past Surgical History:   Procedure Laterality Date    tennis elbow surgery Left     tubligation Bilateral 2022       Family History:   Family History   Problem Relation Age of Onset    Hypertension Mother     Diabetes Mother     Hypertension Father     Diabetes Father     Cancer Brother         lymphoma       Social History:   Social History     Tobacco Use    Smoking status: Every Day     Current packs/day: 1.00     Average packs/day: 1 pack/day for 23.9 years (23.9 ttl pk-yrs)     Types: Cigarettes     Start date: 2000     Passive exposure: Never   Substance Use Topics    Alcohol use: Yes      "Comment: socially    Drug use: No       Allergies: Reviewed    Home Medications:   Current Outpatient Medications on File Prior to Visit   Medication Sig Dispense Refill    dextroamphetamine-amphetamine 30 mg Tab Take 1 tablet by mouth 2 (two) times daily.      buPROPion (WELLBUTRIN XL) 150 MG TB24 tablet Take 1 tablet (150 mg total) by mouth once daily. (Patient not taking: Reported on 9/18/2023) 30 tablet 6    cimetidine (TAGAMET) 200 MG tablet Take 1 tablet (200 mg total) by mouth 4 (four) times daily. (Patient not taking: Reported on 9/18/2023) 120 tablet 0    clotrimazole-betamethasone 1-0.05% (LOTRISONE) cream Apply topically.      nicotine (NICODERM CQ) 21 mg/24 hr Place 1 patch onto the skin once daily. (Patient not taking: Reported on 9/26/2023) 28 patch 0    nicotine, polacrilex, (NICORETTE) 2 mg Gum Take 1 each (2 mg total) by mouth as needed (Use in place of cigarettes). (Patient not taking: Reported on 9/26/2023) 50 each 0     No current facility-administered medications on file prior to visit.       Physical Exam:  Vital Signs:  BP (!) 144/83 (BP Location: Left arm)   Pulse 93   Ht 5' 3" (1.6 m)   Wt 85.5 kg (188 lb 7.9 oz)   LMP 11/16/2023 (Exact Date)   BMI 33.39 kg/m²   Body mass index is 33.39 kg/m².  Physical Exam  Vitals reviewed.   Constitutional:       Appearance: She is well-developed. She is obese. She is not ill-appearing.   HENT:      Head: Normocephalic and atraumatic.   Eyes:      General: No scleral icterus.  Cardiovascular:      Rate and Rhythm: Normal rate and regular rhythm.   Pulmonary:      Effort: Pulmonary effort is normal.      Breath sounds: Normal breath sounds.   Abdominal:      General: Bowel sounds are normal. There is no distension.      Palpations: Abdomen is soft.   Musculoskeletal:         General: Normal range of motion.      Cervical back: Normal range of motion.   Skin:     General: Skin is dry.   Neurological:      Mental Status: She is alert and oriented to " person, place, and time.         Labs: Pertinent labs reviewed.      Assessment:  1. Bloating    2. Chronic constipation    3. Generalized abdominal pain         Recommendations:  I suspect that the symptoms are related to IBS-C that needs improved treatment, however, given the progressive worsening of the symptoms, will plan for a colonoscopy for further evaluation.   Discussed options of treatment with Amitiza or Ibsrela once colonoscopy completed.       F/U in 8-12 weeks to review colonoscopy and develop treatment plan       Thank you so much for allowing me to participate in the care of Vero Chen, FNP-C

## 2023-11-17 ENCOUNTER — HOSPITAL ENCOUNTER (OUTPATIENT)
Dept: PREADMISSION TESTING | Facility: HOSPITAL | Age: 43
Discharge: HOME | End: 2023-11-17
Attending: FAMILY MEDICINE
Payer: COMMERCIAL

## 2023-11-17 DIAGNOSIS — K59.09 CHRONIC CONSTIPATION: Primary | Chronic | ICD-10-CM

## 2023-11-17 DIAGNOSIS — R10.84 GENERALIZED ABDOMINAL PAIN: ICD-10-CM

## 2023-11-17 DIAGNOSIS — R14.0 BLOATING: ICD-10-CM

## 2023-11-17 RX ORDER — POLYETHYLENE GLYCOL 3350, SODIUM SULFATE ANHYDROUS, SODIUM BICARBONATE, SODIUM CHLORIDE, POTASSIUM CHLORIDE 236; 22.74; 6.74; 5.86; 2.97 G/4L; G/4L; G/4L; G/4L; G/4L
4 POWDER, FOR SOLUTION ORAL ONCE
Qty: 4000 ML | Refills: 0 | Status: SHIPPED | OUTPATIENT
Start: 2023-11-17 | End: 2023-11-17

## 2023-11-22 DIAGNOSIS — Z12.31 OTHER SCREENING MAMMOGRAM: ICD-10-CM

## 2023-12-06 ENCOUNTER — CLINICAL SUPPORT (OUTPATIENT)
Dept: SMOKING CESSATION | Facility: CLINIC | Age: 43
End: 2023-12-06

## 2023-12-06 DIAGNOSIS — F17.200 NICOTINE DEPENDENCE: Primary | ICD-10-CM

## 2023-12-06 PROCEDURE — 99407 BEHAV CHNG SMOKING > 10 MIN: CPT | Mod: S$GLB,,,

## 2023-12-06 PROCEDURE — 99407 PR TOBACCO USE CESSATION INTENSIVE >10 MINUTES: ICD-10-PCS | Mod: S$GLB,,,

## 2023-12-06 PROCEDURE — 99999 PR PBB SHADOW E&M-EST. PATIENT-LVL I: ICD-10-PCS | Mod: PBBFAC,,,

## 2023-12-06 PROCEDURE — 99999 PR PBB SHADOW E&M-EST. PATIENT-LVL I: CPT | Mod: PBBFAC,,,

## 2023-12-06 NOTE — PROGRESS NOTES
Called pt to f/u on her 3 month smoking cessation quit status. Pt stated she is still smoking, but did cut back. Informed her she has benefits available and is able to rejoin. Pt not ready to make appointment. She will call back when ready. Informed her of benefit period, phone follow ups, and contact information. Will complete smart form and will continue to follow up on quit #1 episode.

## 2023-12-21 ENCOUNTER — ANESTHESIA EVENT (OUTPATIENT)
Dept: ENDOSCOPY | Facility: HOSPITAL | Age: 43
End: 2023-12-21
Payer: COMMERCIAL

## 2023-12-21 NOTE — ANESTHESIA PREPROCEDURE EVALUATION
12/21/2023  Vero Morrissey is a 43 y.o., female.  Past Surgical History:   Procedure Laterality Date    tennis elbow surgery Left     tubligation Bilateral 2022     Past Medical History:   Diagnosis Date    ADD (attention deficit disorder)    ,      Pre-op Assessment    I have reviewed the Patient Summary Reports.     I have reviewed the Nursing Notes. I have reviewed the NPO Status.   I have reviewed the Medications.     Review of Systems  Anesthesia Hx:  No problems with previous Anesthesia             Denies Family Hx of Anesthesia complications.    Denies Personal Hx of Anesthesia complications.                    Social:  Smoker, Social Alcohol Use       Hematology/Oncology:  Hematology Normal   Oncology Normal                                   EENT/Dental:  EENT/Dental Normal           Cardiovascular:  Cardiovascular Normal                                            Pulmonary:  Pulmonary Normal                       Renal/:  Renal/ Normal                 Hepatic/GI:  Hepatic/GI Normal Bowel Prep.                Musculoskeletal:  Musculoskeletal Normal                Neurological:  Neurology Normal                                      Endocrine:  Endocrine Normal          Obesity / BMI > 30  Dermatological:  Skin Normal    Psych:  Psychiatric Normal     ADD               Physical Exam  General: Well nourished, Cooperative, Alert and Oriented    Airway:  Mallampati: II   Mouth Opening: Normal  TM Distance: Normal  Tongue: Normal  Neck ROM: Normal ROM    Dental:  Intact        Anesthesia Plan  Type of Anesthesia, risks & benefits discussed:    Anesthesia Type: Gen Natural Airway  Intra-op Monitoring Plan: Standard ASA Monitors  Post Op Pain Control Plan: multimodal analgesia  Induction:  IV  Informed Consent: Informed consent signed with the Patient and all parties understand the risks and agree  with anesthesia plan.  All questions answered. Patient consented to blood products? No  ASA Score: 2  Day of Surgery Review of History & Physical: H&P Update referred to the surgeon/provider.    Ready For Surgery From Anesthesia Perspective.     .

## 2023-12-27 ENCOUNTER — ANESTHESIA (OUTPATIENT)
Dept: ENDOSCOPY | Facility: HOSPITAL | Age: 43
End: 2023-12-27
Payer: COMMERCIAL

## 2024-01-30 RX ORDER — BENZONATATE 100 MG/1
CAPSULE ORAL
COMMUNITY
Start: 2023-12-26

## 2024-01-30 RX ORDER — BALOXAVIR MARBOXIL 80 MG/1
1 TABLET, FILM COATED ORAL EVERY MORNING
COMMUNITY
Start: 2023-12-26

## 2024-01-30 RX ORDER — PROMETHAZINE HYDROCHLORIDE AND DEXTROMETHORPHAN HYDROBROMIDE 6.25; 15 MG/5ML; MG/5ML
5 SYRUP ORAL EVERY 6 HOURS PRN
COMMUNITY
Start: 2023-11-29

## 2024-02-07 ENCOUNTER — HOSPITAL ENCOUNTER (OUTPATIENT)
Facility: HOSPITAL | Age: 44
Discharge: HOME | End: 2024-02-07
Attending: INTERNAL MEDICINE | Admitting: INTERNAL MEDICINE
Payer: COMMERCIAL

## 2024-02-07 VITALS
HEIGHT: 63 IN | WEIGHT: 188.81 LBS | DIASTOLIC BLOOD PRESSURE: 71 MMHG | HEART RATE: 93 BPM | OXYGEN SATURATION: 96 % | TEMPERATURE: 98 F | SYSTOLIC BLOOD PRESSURE: 125 MMHG | BODY MASS INDEX: 33.45 KG/M2 | RESPIRATION RATE: 16 BRPM

## 2024-02-07 LAB
B-HCG UR QL: NEGATIVE
CTP QC/QA: YES

## 2024-02-07 PROCEDURE — D9220A PRA ANESTHESIA: Mod: ,,, | Performed by: NURSE ANESTHETIST, CERTIFIED REGISTERED

## 2024-02-07 PROCEDURE — 63600175 PHARM REV CODE 636 W HCPCS: Performed by: NURSE ANESTHETIST, CERTIFIED REGISTERED

## 2024-02-07 PROCEDURE — 37000008 HC ANESTHESIA 1ST 15 MINUTES: Performed by: INTERNAL MEDICINE

## 2024-02-07 PROCEDURE — 63600175 PHARM REV CODE 636 W HCPCS: Performed by: INTERNAL MEDICINE

## 2024-02-07 PROCEDURE — 25000003 PHARM REV CODE 250: Performed by: NURSE ANESTHETIST, CERTIFIED REGISTERED

## 2024-02-07 PROCEDURE — 45378 DIAGNOSTIC COLONOSCOPY: CPT | Mod: ,,, | Performed by: INTERNAL MEDICINE

## 2024-02-07 PROCEDURE — 37000009 HC ANESTHESIA EA ADD 15 MINS: Performed by: INTERNAL MEDICINE

## 2024-02-07 PROCEDURE — 25000003 PHARM REV CODE 250: Performed by: INTERNAL MEDICINE

## 2024-02-07 PROCEDURE — 45378 DIAGNOSTIC COLONOSCOPY: CPT | Performed by: INTERNAL MEDICINE

## 2024-02-07 PROCEDURE — 81025 URINE PREGNANCY TEST: CPT | Performed by: INTERNAL MEDICINE

## 2024-02-07 RX ORDER — LIDOCAINE HYDROCHLORIDE 20 MG/ML
INJECTION INTRAVENOUS
Status: DISCONTINUED | OUTPATIENT
Start: 2024-02-07 | End: 2024-02-07

## 2024-02-07 RX ORDER — PROPOFOL 10 MG/ML
INJECTION, EMULSION INTRAVENOUS
Status: DISCONTINUED | OUTPATIENT
Start: 2024-02-07 | End: 2024-02-07

## 2024-02-07 RX ORDER — DEXTROMETHORPHAN/PSEUDOEPHED 2.5-7.5/.8
DROPS ORAL
Status: COMPLETED | OUTPATIENT
Start: 2024-02-07 | End: 2024-02-07

## 2024-02-07 RX ORDER — SODIUM CHLORIDE, SODIUM LACTATE, POTASSIUM CHLORIDE, CALCIUM CHLORIDE 600; 310; 30; 20 MG/100ML; MG/100ML; MG/100ML; MG/100ML
INJECTION, SOLUTION INTRAVENOUS CONTINUOUS
Status: ACTIVE | OUTPATIENT
Start: 2024-02-07

## 2024-02-07 RX ADMIN — PROPOFOL 20 MG: 10 INJECTION, EMULSION INTRAVENOUS at 09:02

## 2024-02-07 RX ADMIN — PROPOFOL 80 MG: 10 INJECTION, EMULSION INTRAVENOUS at 09:02

## 2024-02-07 RX ADMIN — LIDOCAINE HYDROCHLORIDE 40 MG: 20 INJECTION INTRAVENOUS at 09:02

## 2024-02-07 RX ADMIN — SODIUM CHLORIDE, POTASSIUM CHLORIDE, SODIUM LACTATE AND CALCIUM CHLORIDE: 600; 310; 30; 20 INJECTION, SOLUTION INTRAVENOUS at 08:02

## 2024-02-07 NOTE — TRANSFER OF CARE
"Anesthesia Transfer of Care Note    Patient: Vero Morrissey    Procedure(s) Performed: Procedure(s) (LRB):  COLONOSCOPY (N/A)    Patient location: PACU    Anesthesia Type: general    Transport from OR: Transported from OR on room air with adequate spontaneous ventilation    Post pain: adequate analgesia    Post assessment: no apparent anesthetic complications    Post vital signs: stable    Level of consciousness: alert    Nausea/Vomiting: no nausea/vomiting    Complications: none    Transfer of care protocol was followed      Last vitals: Visit Vitals  /71 (BP Location: Right arm, Patient Position: Sitting)   Pulse 93   Temp 36.4 °C (97.5 °F) (Temporal)   Resp 16   Ht 5' 3" (1.6 m)   Wt 85.6 kg (188 lb 13.2 oz)   LMP 01/03/2024   SpO2 96%   Breastfeeding No   BMI 33.45 kg/m²     "

## 2024-02-07 NOTE — ANESTHESIA POSTPROCEDURE EVALUATION
Anesthesia Post Evaluation    Patient: Vero Morrissey    Procedure(s) Performed: Procedure(s) (LRB):  COLONOSCOPY (N/A)    Final Anesthesia Type: general      Patient location during evaluation: PACU  Patient participation: Yes- Able to Participate  Level of consciousness: awake  Post-procedure vital signs: reviewed and stable  Pain management: adequate  Airway patency: patent    PONV status at discharge: No PONV  Anesthetic complications: no      Cardiovascular status: stable  Respiratory status: unassisted  Hydration status: euvolemic  Follow-up not needed.              Vitals Value Taken Time   /69 02/07/24 0937        Pulse 87 02/07/24 0937   Resp 43 02/07/24 0937   SpO2 100 % 02/07/24 0937   Vitals shown include unvalidated device data.      No case tracking events are documented in the log.      Pain/Barron Score: Barron Score: 10 (2/7/2024  9:27 AM)

## 2024-02-07 NOTE — PROVATION PATIENT INSTRUCTIONS
Discharge Summary/Instructions after an Endoscopic Procedure  Patient Name: Vero Morrissey  Patient MRN: [id number]  Patient YOB: 1980 Wednesday, February 7, 2024  Marc Rollins MD  Dear patient,  As a result of recent federal legislation (The Federal Cures Act), you may   receive lab or pathology results from your procedure in your [facility]   account before your physician is able to contact you. Your physician or   their representative will relay the results to you with their   recommendations at their soonest availability.  Thank you,  RESTRICTIONS:  During your procedure today, you received medications for sedation.  These   medications may affect your judgment, balance and coordination.  Therefore,   for 24 hours, you have the following restrictions:   - DO NOT drive a car, operate machinery, make legal/financial decisions,   sign important papers or drink alcohol.    ACTIVITY:  Today: no heavy lifting, straining or running due to procedural   sedation/anesthesia.  The following day: return to full activity including work.  DIET:  Eat and drink normally unless instructed otherwise.     TREATMENT FOR COMMON SIDE EFFECTS:  - Mild abdominal pain, nausea, belching, bloating or excessive gas:  rest,   eat lightly and use a heating pad.  - Sore Throat: treat with throat lozenges and/or gargle with warm salt   water.  - Because air was used during the procedure, expelling large amounts of air   from your rectum or belching is normal.  - If a bowel prep was taken, you may not have a bowel movement for 1-3 days.    This is normal.  SYMPTOMS TO WATCH FOR AND REPORT TO YOUR PHYSICIAN:  1. Abdominal pain or bloating, other than gas cramps.  2. Chest pain.  3. Back pain.  4. Signs of infection such as: chills or fever occurring within 24 hours   after the procedure.  5. Rectal bleeding, which would show as bright red, maroon, or black stools.   (A tablespoon of blood from the rectum is not serious, especially  if   hemorrhoids are present.)  6. Vomiting.  7. Weakness or dizziness.  GO DIRECTLY TO THE NEAREST EMERGENCY ROOM IF YOU HAVE ANY OF THE FOLLOWING:      Difficulty breathing              Chills and/or fever over 101 F   Persistent vomiting and/or vomiting blood   Severe abdominal pain   Severe chest pain   Black, tarry stools   Bleeding- more than one tablespoon   Any other symptom or condition that you feel may need urgent attention  Your doctor recommends these additional instructions:  If any biopsies were taken, your doctors clinic will contact you in 1 to 2   weeks with any results.  - Discharge patient to home.   - Resume previous diet.   - Continue present medications.   - Repeat colonoscopy in 10 years for screening purposes.   - Return to referring physician.   - Patient has a contact number available for emergencies.  The signs and   symptoms of potential delayed complications were discussed with the   patient.  Return to normal activities tomorrow.  Written discharge   instructions were provided to the patient.  For questions, problems or results please call your physician Marc Rollins MD at Work:  [phone]  If you have any questions about the above instructions, call the GI   department at [phone] or call the endoscopy unit at [phone]   from 7am until 3 pm.  [facility] [place], EMERGENCY ROOM PHONE NUMBER:   [phone]  IF A COMPLICATION OR EMERGENCY SITUATION ARISES AND YOU ARE UNABLE TO REACH   YOUR PHYSICIAN - GO DIRECTLY TO THE EMERGENCY ROOM.  I have read or have had read to me these discharge instructions for my   procedure and have received a written copy.  I understand these   instructions and will follow-up with my physician if I have any questions.     __________________________________       _____________________________________  Nurse Signature                                          Patient/Designated   Responsible Party Signature  Marc  JANIE Rollins MD  2/7/2024 9:27:14 AM  This report has been verified and signed electronically.  Dear patient,  As a result of recent federal legislation (The Federal Cures Act), you may   receive lab or pathology results from your procedure in your [facility]   account before your physician is able to contact you. Your physician or   their representative will relay the results to you with their   recommendations at their soonest availability.  Thank you,  PROVATION

## 2024-02-07 NOTE — PLAN OF CARE
Discharge instructions reviewed with pt and daughter, handouts given, verbalized understanding with no further questions at this time. Dr. Rollins spoke to pt at bedside, reviewed procedure and answered questions aware they are awaiting biopsy results with MD telephone number provided per AVS sheet. VSS on RA, no pain or nausea noted, tolerating po fluids without difficulty, no other complaints noted. Fall precautions reviewed, consents in chart, PIV removed.

## 2024-02-07 NOTE — H&P
PRE PROCEDURE H&P    Patient Name: Vero Morrissey  MRN: [id number]  : 1980  Date of Procedure:  2024  Referring Physician: Helen Chen FNP  Primary Physician: Thelma Eddy MD  Procedure Physician: Marc Rollins MD       Planned Procedure: Colonoscopy  Diagnosis: constipation  Chief Complaint: Same as above    HPI: Patient is an 43 y.o. female is here for the above.     Last colonoscopy: Index colonoscopy  Family history: None   Anticoagulation: None    Past Medical History:   Past Medical History:   Diagnosis Date    ADD (attention deficit disorder)         Past Surgical History:  Past Surgical History:   Procedure Laterality Date    tennis elbow surgery Left     tubligation Bilateral         Home Medications:  Prior to Admission medications    Medication Sig Start Date End Date Taking? Authorizing Provider   baloxavir marboxiL (XOFLUZA) 80 mg tablet Take 1 tablet by mouth every morning. 23  Yes Provider, Historical   benzonatate (TESSALON) 100 MG capsule Take 1-2 tablets three times daily as needed. 23  Yes Provider, Historical   promethazine-dextromethorphan (PROMETHAZINE-DM) 6.25-15 mg/5 mL Syrp Take 5 mLs by mouth every 6 (six) hours as needed. 23  Yes Provider, Historical   buPROPion (WELLBUTRIN XL) 150 MG TB24 tablet Take 1 tablet (150 mg total) by mouth once daily.  Patient not taking: Reported on 2023   Thelma Eddy MD   dextroamphetamine-amphetamine 30 mg Tab Take 1 tablet by mouth 2 (two) times daily. 23   Provider, Historical   nicotine (NICODERM CQ) 21 mg/24 hr Place 1 patch onto the skin once daily.  Patient not taking: Reported on 2023   Ayo Hu MD   nicotine, polacrilex, (NICORETTE) 2 mg Gum Take 1 each (2 mg total) by mouth as needed (Use in place of cigarettes).  Patient not taking: Reported on 2023   Ayo Hu MD        Allergies:  Review of patient's  "allergies indicates:  No Known Allergies     Social History:   Social History     Socioeconomic History    Marital status:    Tobacco Use    Smoking status: Every Day     Current packs/day: 1.00     Average packs/day: 1 pack/day for 24.1 years (24.1 ttl pk-yrs)     Types: Cigarettes     Start date: 2000     Passive exposure: Never   Substance and Sexual Activity    Alcohol use: Yes     Comment: socially    Drug use: No    Sexual activity: Yes     Partners: Male       Family History:  Family History   Problem Relation Age of Onset    Hypertension Mother     Diabetes Mother     Hypertension Father     Diabetes Father     Cancer Brother         lymphoma       ROS: No acute cardiac events, no acute respiratory complaints.     Physical Exam (all patients):    /71 (BP Location: Right arm, Patient Position: Sitting)   Pulse 93   Temp 97.5 °F (36.4 °C) (Temporal)   Resp 16   Ht 5' 3" (1.6 m)   Wt 85.6 kg (188 lb 13.2 oz)   LMP 01/03/2024   SpO2 96%   Breastfeeding No   BMI 33.45 kg/m²   Lungs: Clear to auscultation bilaterally, respirations unlabored  Heart: Regular rate and rhythm, S1 and S2 normal, no obvious murmurs  Abdomen:         Soft, non-tender, bowel sounds normal, no masses, no organomegaly    Lab Results   Component Value Date    WBC 12.99 (H) 08/30/2023    MCV 96 08/30/2023    RDW 13.2 08/30/2023     08/30/2023    GLU 70 08/30/2023    BUN 7 08/30/2023     08/30/2023    K 3.9 08/30/2023     08/30/2023        SEDATION PLAN: per anesthesia      History reviewed, vital signs satisfactory, cardiopulmonary status satisfactory, sedation options, risks and plans have been discussed with the patient  All their questions were answered and the patient agrees to the sedation procedures as planned and the patient is deemed an appropriate candidate for the sedation as planned.    Procedure explained to patient, informed consent obtained and placed in chart.    Marc BANKS" Ayo  2/7/2024

## 2024-03-21 ENCOUNTER — CLINICAL SUPPORT (OUTPATIENT)
Dept: SMOKING CESSATION | Facility: CLINIC | Age: 44
End: 2024-03-21

## 2024-03-21 DIAGNOSIS — F17.200 NICOTINE DEPENDENCE: Primary | ICD-10-CM

## 2024-03-21 PROCEDURE — 99999 PR PBB SHADOW E&M-EST. PATIENT-LVL I: CPT | Mod: PBBFAC,,,

## 2024-03-21 NOTE — PROGRESS NOTES
Spoke with patient today in regard to smoking cessation progress for 6 month telephone follow up, she states not tobacco free. Patient states not interested in returning to the program at this time.  Patient states she will start NRT and try to quit on her own. Informed patient of benefit period, future follow up, and contact information if any further help or support is needed. Will complete smart form for 6 month follow up on Quit attempt #1.

## 2024-05-14 ENCOUNTER — OFFICE VISIT (OUTPATIENT)
Dept: PODIATRY | Facility: CLINIC | Age: 44
End: 2024-05-14
Payer: COMMERCIAL

## 2024-05-14 VITALS — WEIGHT: 188.69 LBS | BODY MASS INDEX: 33.43 KG/M2 | HEIGHT: 63 IN

## 2024-05-14 DIAGNOSIS — B07.0 PLANTAR WART OF LEFT FOOT: Primary | ICD-10-CM

## 2024-05-14 PROCEDURE — 17000 DESTRUCT PREMALG LESION: CPT | Mod: S$GLB,,, | Performed by: PODIATRIST

## 2024-05-14 PROCEDURE — 1159F MED LIST DOCD IN RCRD: CPT | Mod: CPTII,S$GLB,, | Performed by: PODIATRIST

## 2024-05-14 PROCEDURE — 99214 OFFICE O/P EST MOD 30 MIN: CPT | Mod: 25,S$GLB,, | Performed by: PODIATRIST

## 2024-05-14 PROCEDURE — 3008F BODY MASS INDEX DOCD: CPT | Mod: CPTII,S$GLB,, | Performed by: PODIATRIST

## 2024-05-14 PROCEDURE — 99999 PR PBB SHADOW E&M-EST. PATIENT-LVL III: CPT | Mod: PBBFAC,,, | Performed by: PODIATRIST

## 2024-05-14 NOTE — PROGRESS NOTES
PODIATRIC MEDICINE AND SURGERY          CHIEF COMPLAINT   Chief Complaint   Patient presents with    Plantar Warts     C/o plantar wart at the bottom of the left foot, pt states its been this way for couple a year but it started bothering her more a couple months ago, same spot treated last appt, pt rates pain 5/10, pt states it has pain to touch, pt is non-diabetic,       HPI  Vero Morrissey is a 44 y.o. female who presents with complaints of painful lesion on bottom of left  foot. States that lesion has been present for several years. She did have resolution at prior debridements. Lesion has returned with associated pain.      PMH  Past Medical History:   Diagnosis Date    ADD (attention deficit disorder)        PROBLEM LIST  Patient Active Problem List    Diagnosis Date Noted    Chronic constipation 08/30/2023    Tobacco use 08/30/2023       MEDS  Current Outpatient Medications on File Prior to Visit   Medication Sig Dispense Refill    dextroamphetamine-amphetamine 30 mg Tab Take 1 tablet by mouth 2 (two) times daily.      baloxavir marboxiL (XOFLUZA) 80 mg tablet Take 1 tablet by mouth every morning.      benzonatate (TESSALON) 100 MG capsule Take 1-2 tablets three times daily as needed.      buPROPion (WELLBUTRIN XL) 150 MG TB24 tablet Take 1 tablet (150 mg total) by mouth once daily. 30 tablet 6    nicotine (NICODERM CQ) 21 mg/24 hr Place 1 patch onto the skin once daily. 28 patch 0    nicotine, polacrilex, (NICORETTE) 2 mg Gum Take 1 each (2 mg total) by mouth as needed (Use in place of cigarettes). 50 each 0    promethazine-dextromethorphan (PROMETHAZINE-DM) 6.25-15 mg/5 mL Syrp Take 5 mLs by mouth every 6 (six) hours as needed.       Current Facility-Administered Medications on File Prior to Visit   Medication Dose Route Frequency Provider Last Rate Last Admin    lactated ringers infusion   Intravenous Continuous Marc Rollins MD 10 mL/hr at 02/07/24 0854 Restarted at 02/07/24 0909  "      [facility]     Past Surgical History:   Procedure Laterality Date    COLONOSCOPY N/A 2/7/2024    Procedure: COLONOSCOPY;  Surgeon: Marc Rollins MD;  Location: [facility];  Service: Endoscopy;  Laterality: N/A;    tennis elbow surgery Left     tubligation Bilateral 2022        ALL  Review of patient's allergies indicates:  No Known Allergies    SOC     Social History     Tobacco Use    Smoking status: Every Day     Current packs/day: 1.00     Average packs/day: 1 pack/day for 24.4 years (24.4 ttl pk-yrs)     Types: Cigarettes     Start date: 2000     Passive exposure: Never   Substance Use Topics    Alcohol use: Yes     Comment: socially    Drug use: No         Family HX    Family History   Problem Relation Name Age of Onset    Hypertension Mother      Diabetes Mother      Hypertension Father      Diabetes Father      Cancer Brother          lymphoma          REVIEW OF SYSTEMS  General: Denies any fever or chills  Chest: Denies shortness of breath, wheezing, coughing, or sputum production  Heart: Denies chest pain, cold extremities, orthopenia, or reduced exercise tolerance  As noted above and per history of current illness above, otherwise negative in the remainder of the 14 systems.     PHYSICAL EXAM  Vitals:    05/14/24 1007   Weight: 85.6 kg (188 lb 11.4 oz)   Height: 5' 3" (1.6 m)   PainSc:   5         General: This patient is well-developed, well-nourished and appears stated age, well-oriented to person, place and time, and cooperative and pleasant on today's visit        LOWER EXTREMITY PHYSICAL EXAM  VASCULAR  Dorsalis pedis and posterior tibial pulses palpable 2/4 bilaterally. Capillary refill time immediate to the toes. Feet are warm to the touch. Skin temperature warm to warm from proximally to distally There are no varicosities, telangiectasias noted to bilateral foot and ankle regions. There are no ecchymoses noted to bilateral foot and ankle regions. There is no gross lower extremity " edema.    DERMATOLOGIC  Skin moist with healthy texture and turgor.There are no open ulcerations, lacerations, or fissures to bilateral foot and ankle regions. There are no signs of infection as there is no erythema, no proximal-extending lymphangiitis, no fluctuance, or crepitus noted on palpation to bilateral foot and ankle regions. There is no interdigital maceration.  Thickened hyperkeratotic tissue noted plantar left foot. Post debridement reveals loss of skin lines. There is punctate bleeding with debridement     NEUROLOGIC  Epicritic sensation is intact as the patient is able to sense light touch to bilateral foot and ankle regions. Achilles and patellar deep tendon reflexes intact. Babinski reflex absent    ORTHOPEDIC/BIOMECHANICAL  Mild pain on palpation of above noted lesion especially with side to side compression. Muscle strength AT/EHL/EDL/PT: 5/5; Achilles/Gastroc/Soleus: 5/5; PB/PL: 5/5 Muscle tone is normal. Ankle joint ROM non painful with DF/PF, non-crepitus; STJ ROM  Inversion/Eversion non painful, non crepitus ; MTPJ b/l  DF/PF, non crepitus     ASSESSMENT  1. Plantar verrucae, Left foot        PLAN    1. Patient was educated about clinical and imaging findings, and verbalizes understanding of above.  2. Treatment plan: Risks and benefits of the procedure were discussed with the patient.  Risks include pain, bleeding, scarring, loss of sensation (temporary or permanent), infection, anesthesia reaction, and need for further or repeat procedures.  Benefits of the procedure include resolution of the condition.  All questions were answered and the patient gave verbal consent to proceed.      With patient's permission, sharp excisional debridement and chemical destruction of soft tissue lesion deep to epidermal layer x 1 was performed with #15 blade. Patient relates relief following the procedure. Cantharidin applied to wart site. Silk tape applied for occlusion and then an offloading donut pad. Pt  instructed to keep adhesive intact for 2-4 hours. Post operative care provided in AVS.      3. RTC  for follow up/evaluation in 3 weeks, repeat application as necessary, or sooner if any issues, questions or concerns.      Report Electronically Signed By:     Eve Noonan DPM   Podiatry  [facility]  5/14/2024

## 2024-06-04 ENCOUNTER — OFFICE VISIT (OUTPATIENT)
Dept: PODIATRY | Facility: CLINIC | Age: 44
End: 2024-06-04
Payer: COMMERCIAL

## 2024-06-04 VITALS — HEIGHT: 63 IN | WEIGHT: 188.69 LBS | BODY MASS INDEX: 33.43 KG/M2

## 2024-06-04 DIAGNOSIS — B07.0 PLANTAR WART OF LEFT FOOT: Primary | ICD-10-CM

## 2024-06-04 PROCEDURE — 3008F BODY MASS INDEX DOCD: CPT | Mod: CPTII,S$GLB,, | Performed by: PODIATRIST

## 2024-06-04 PROCEDURE — 99213 OFFICE O/P EST LOW 20 MIN: CPT | Mod: S$GLB,,, | Performed by: PODIATRIST

## 2024-06-04 PROCEDURE — 1159F MED LIST DOCD IN RCRD: CPT | Mod: CPTII,S$GLB,, | Performed by: PODIATRIST

## 2024-06-04 PROCEDURE — 99999 PR PBB SHADOW E&M-EST. PATIENT-LVL III: CPT | Mod: PBBFAC,,, | Performed by: PODIATRIST

## 2024-06-04 NOTE — PROGRESS NOTES
PODIATRIC MEDICINE AND SURGERY          CHIEF COMPLAINT   Chief Complaint   Patient presents with    Follow-up     Patient denies pain to wart on left foot. Patient is nondiabetic. Patient is wearing sandals.       HPI  Vero Morrissey is a 44 y.o. female who presents with f/u plantar wart. She has been complaint with home care. Pain has resolved.       PMH  Past Medical History:   Diagnosis Date    ADD (attention deficit disorder)        PROBLEM LIST  Patient Active Problem List    Diagnosis Date Noted    Chronic constipation 08/30/2023    Tobacco use 08/30/2023       MEDS  Current Outpatient Medications on File Prior to Visit   Medication Sig Dispense Refill    dextroamphetamine-amphetamine 30 mg Tab Take 1 tablet by mouth 2 (two) times daily.      baloxavir marboxiL (XOFLUZA) 80 mg tablet Take 1 tablet by mouth every morning.      benzonatate (TESSALON) 100 MG capsule Take 1-2 tablets three times daily as needed.      buPROPion (WELLBUTRIN XL) 150 MG TB24 tablet Take 1 tablet (150 mg total) by mouth once daily. 30 tablet 6    nicotine (NICODERM CQ) 21 mg/24 hr Place 1 patch onto the skin once daily. 28 patch 0    nicotine, polacrilex, (NICORETTE) 2 mg Gum Take 1 each (2 mg total) by mouth as needed (Use in place of cigarettes). 50 each 0    promethazine-dextromethorphan (PROMETHAZINE-DM) 6.25-15 mg/5 mL Syrp Take 5 mLs by mouth every 6 (six) hours as needed.       Current Facility-Administered Medications on File Prior to Visit   Medication Dose Route Frequency Provider Last Rate Last Admin    lactated ringers infusion   Intravenous Continuous Marc Rollins MD 10 mL/hr at 02/07/24 0854 Restarted at 02/07/24 0909       [facility]     Past Surgical History:   Procedure Laterality Date    COLONOSCOPY N/A 2/7/2024    Procedure: COLONOSCOPY;  Surgeon: Marc Rollins MD;  Location: [facility];  Service: Endoscopy;  Laterality: N/A;    tennis elbow surgery Left     tubligation Bilateral 2022     "    ALL  Review of patient's allergies indicates:  No Known Allergies    SOC     Social History     Tobacco Use    Smoking status: Every Day     Current packs/day: 1.00     Average packs/day: 1 pack/day for 24.4 years (24.4 ttl pk-yrs)     Types: Cigarettes     Start date: 2000     Passive exposure: Never   Substance Use Topics    Alcohol use: Yes     Comment: socially    Drug use: No         Family HX    Family History   Problem Relation Name Age of Onset    Hypertension Mother      Diabetes Mother      Hypertension Father      Diabetes Father      Cancer Brother          lymphoma          REVIEW OF SYSTEMS  General: Denies any fever or chills  Chest: Denies shortness of breath, wheezing, coughing, or sputum production  Heart: Denies chest pain, cold extremities, orthopenia, or reduced exercise tolerance  As noted above and per history of current illness above, otherwise negative in the remainder of the 14 systems.     PHYSICAL EXAM  Vitals:    06/04/24 0823   Weight: 85.6 kg (188 lb 11.4 oz)   Height: 5' 3" (1.6 m)   PainSc: 0-No pain   PainLoc: Foot         General: This patient is well-developed, well-nourished and appears stated age, well-oriented to person, place and time, and cooperative and pleasant on today's visit        LOWER EXTREMITY PHYSICAL EXAM  VASCULAR  Dorsalis pedis and posterior tibial pulses palpable 2/4 bilaterally. Capillary refill time immediate to the toes. Feet are warm to the touch. Skin temperature warm to warm from proximally to distally There are no varicosities, telangiectasias noted to bilateral foot and ankle regions. There are no ecchymoses noted to bilateral foot and ankle regions. There is no gross lower extremity edema.    DERMATOLOGIC  Skin moist with healthy texture and turgor.There are no open ulcerations, lacerations, or fissures to bilateral foot and ankle regions. There are no signs of infection as there is no erythema, no proximal-extending lymphangiitis, no fluctuance, " or crepitus noted on palpation to bilateral foot and ankle regions. There is no interdigital maceration.  Plantar left foot- no lesions noted    NEUROLOGIC  Epicritic sensation is intact as the patient is able to sense light touch to bilateral foot and ankle regions. Achilles and patellar deep tendon reflexes intact. Babinski reflex absent      ASSESSMENT  1. Plantar verrucae, Left foot- Resolved        PLAN    1. Patient was educated about clinical and imaging findings, and verbalizes understanding of above.  Monitor site. RTC PRN    Report Electronically Signed By:     Eve Noonan DPM   Podiatry  [facility]  6/4/2024

## 2024-08-13 ENCOUNTER — OFFICE VISIT (OUTPATIENT)
Dept: PODIATRY | Facility: CLINIC | Age: 44
End: 2024-08-13
Payer: COMMERCIAL

## 2024-08-13 VITALS — HEIGHT: 63 IN | BODY MASS INDEX: 33.43 KG/M2 | WEIGHT: 188.69 LBS

## 2024-08-13 DIAGNOSIS — B07.0 PLANTAR WART OF LEFT FOOT: Primary | ICD-10-CM

## 2024-08-13 PROCEDURE — 1159F MED LIST DOCD IN RCRD: CPT | Mod: CPTII,S$GLB,, | Performed by: PODIATRIST

## 2024-08-13 PROCEDURE — 99214 OFFICE O/P EST MOD 30 MIN: CPT | Mod: 25,S$GLB,, | Performed by: PODIATRIST

## 2024-08-13 PROCEDURE — 3008F BODY MASS INDEX DOCD: CPT | Mod: CPTII,S$GLB,, | Performed by: PODIATRIST

## 2024-08-13 PROCEDURE — 17000 DESTRUCT PREMALG LESION: CPT | Mod: S$GLB,,, | Performed by: PODIATRIST

## 2024-08-13 PROCEDURE — 99999 PR PBB SHADOW E&M-EST. PATIENT-LVL III: CPT | Mod: PBBFAC,,, | Performed by: PODIATRIST

## 2024-08-13 NOTE — PROGRESS NOTES
"    Podiatry Department    Patient ID: Vero Morrissey is a 44 y.o. female.    Chief Complaint: Follow-up (3 week f/u wart sub 5th met head, rates pain 7/10, x 3 weeks, non-diabetic, wears sandals)    History of Present Illness    CHIEF COMPLAINT:  Patient presents today for follow up of plantar wart.    PLANTAR WART HISTORY AND SYMPTOMS:  She reports pain and tenderness on the side of her left foot associated with a recurrent plantar wart, specifically in the area of the fifth metatarsal head. She notes swelling on the top of her foot. The wart had previously resolved completely with topical medication but has recurred rapidly and severely. She describes the pain as tender and states the wart "came back like fast and hard this time". She experiences pain with palpation and weight-bearing.    PREVIOUS TREATMENTS:  She has a history of various wart treatments, including surgical excision by a dermatologist in-office, use of Compound W, and duct tape occlusion. She mentions difficulty with duct tape adherence in the past, requiring the use of Coban wrap for better application. She denies any other home remedies beyond Compound W.    CURRENT MANAGEMENT PLAN:  She agrees to restart Compound W application with duct tape occlusion for the next three weeks before considering surgical intervention.    PATIENT CONCERNS AND PREFERENCES:  She expresses frustration with the recurrence and pain, stating "I hate it." She indicates interest in more aggressive treatment options, including surgery, if available. She is willing to consider a more invasive surgical procedure that would require a period of non-weightbearing, seeking a more definitive solution.    SOCIAL HISTORY:  She reports having a sedentary job, primarily sitting at a desk for work.    ROS:  Musculoskeletal: +muscle pain, +joint pain, +joint swelling            Physical Exam    Musculoskeletal: Hyperkeratotic tissue sub fifth metatarsal head on left foot. Pain " with palpation and weightbearing on left foot. Edema noted to left foot.  Skin: Loss of skin retention lines consistent with plantar verruca.           Diagnoses:  1. Plantar wart of left foot        Assessment & Plan    - Patient presents with recurrence of plantar verruca on left foot, with associated pain and edema  - Considered conservative treatment with topical medication versus surgical intervention  - Opted for initial trial of topical treatment before considering more invasive options  PLANTAR VERRUCA:  - Performed debridement of plantar verruca on left foot. CANTHRONE applied.   - Apply duct tape over the Compound W application.  - Use Coban wrap if needed to secure the duct tape.  - Restart Compound W application to affected area on left foot starting tomorrow.  - Apply Compound W daily and cover with duct tape.  - Follow up in 3 weeks for repeat debridement and reassessment.  - If topical treatment is ineffective, consider scheduling surgical intervention at the [place] location or in-office, pending equipment availability.              Future Appointments   Date Time Provider Department Center   9/3/2024  9:45 AM Eve Noonan DPM [facility]        This note was generated with the assistance of ambient listening technology. Verbal consent was obtained by the patient and accompanying visitor(s) for the recording of patient appointment to facilitate this note. I attest to having reviewed and edited the generated note for accuracy, though some syntax or spelling errors may persist. Please contact the author of this note for any clarification.      Report Electronically Signed By:     Eve Noonan DPM   Podiatry  [facility]   8/13/2024

## 2024-08-20 ENCOUNTER — PATIENT MESSAGE (OUTPATIENT)
Dept: PODIATRY | Facility: CLINIC | Age: 44
End: 2024-08-20
Payer: COMMERCIAL

## 2024-09-13 ENCOUNTER — CLINICAL SUPPORT (OUTPATIENT)
Dept: SMOKING CESSATION | Facility: CLINIC | Age: 44
End: 2024-09-13

## 2024-09-13 DIAGNOSIS — F17.200 NICOTINE DEPENDENCE: Primary | ICD-10-CM

## 2024-09-13 PROCEDURE — 99407 BEHAV CHNG SMOKING > 10 MIN: CPT | Mod: ,,, | Performed by: GENERAL PRACTICE

## 2024-09-13 NOTE — PROGRESS NOTES
Spoke with patient today in regard to smoking cessation progress 12 month telephone follow up, she states that she is not tobacco free.  Patient states she smokes a pack of cigarettes daily.  Patient also states she has use various treatment options and they were ineffective with helping her quit smoking.Informed patient of benefit period, future follow up, and contact information if any further help or support is needed.  Will complete smart form for 12 month follow up on Quit attempt #1 and resolve episode.

## 2024-09-17 ENCOUNTER — OFFICE VISIT (OUTPATIENT)
Dept: PODIATRY | Facility: CLINIC | Age: 44
End: 2024-09-17
Payer: COMMERCIAL

## 2024-09-17 VITALS — WEIGHT: 188.69 LBS | HEIGHT: 63 IN | BODY MASS INDEX: 33.43 KG/M2

## 2024-09-17 DIAGNOSIS — B07.0 PLANTAR WART OF LEFT FOOT: Primary | ICD-10-CM

## 2024-09-17 DIAGNOSIS — G89.29 CHRONIC FOOT PAIN, LEFT: ICD-10-CM

## 2024-09-17 DIAGNOSIS — M79.672 CHRONIC FOOT PAIN, LEFT: ICD-10-CM

## 2024-09-17 PROCEDURE — 99213 OFFICE O/P EST LOW 20 MIN: CPT | Mod: S$GLB,,, | Performed by: PODIATRIST

## 2024-09-17 PROCEDURE — 1159F MED LIST DOCD IN RCRD: CPT | Mod: CPTII,S$GLB,, | Performed by: PODIATRIST

## 2024-09-17 PROCEDURE — 99999 PR PBB SHADOW E&M-EST. PATIENT-LVL III: CPT | Mod: PBBFAC,,, | Performed by: PODIATRIST

## 2024-09-17 PROCEDURE — 3008F BODY MASS INDEX DOCD: CPT | Mod: CPTII,S$GLB,, | Performed by: PODIATRIST

## 2024-09-17 NOTE — LETTER
September 17, 2024      [facility]  [address] [place] [address]  Phone: [phone]       Patient: Vero Morrissey   YOB: 1980  Date of Visit: 09/17/2024    To Whom It May Concern:    Julian Morrissey  was at [facility] on 09/17/2024. The patient may return to work on 9/18/2024 with no restrictions. If you have any questions or concerns, or if I can be of further assistance, please do not hesitate to contact me.    Sincerely,      Rima Gorman MA

## 2024-09-17 NOTE — PROGRESS NOTES
Podiatry Department    Patient ID: Vero Morrissey is a 44 y.o. female.    Chief Complaint: Plantar Warts (Pt c/o wart of left foot, pt states the pain 5/10, pt states its still bothering her so she states she wants it cut out now, pt is non-diabetic)    History of Present Illness    CHIEF COMPLAINT:  Patient presents today for follow up of a soft tissue lesion on the left foot.    SOFT TISSUE LESION:  She has a soft tissue lesion under the fifth metatarsal head of the left foot. She expresses readiness to proceed with the curettage procedure. She previously attempted treatments with compound W + canthrone without resolution.      PROCEDURE PLANNING:  She is currently in school and is coordinating the timing of the procedure with her school commitments. She has agreed to schedule the procedure and will take time off from school for it.      ROS:  Skin: +lesion            Physical Exam    Musculoskeletal: Soft tissue lesion left sub fifth metatarsal head.  Skin: No erythema. No drainage. No signs of infection.  Cardiovascular: Palpable pedal pulses.           Diagnoses:  1. Plantar wart of left foot    2. Chronic foot pain, left          Assessment & Plan    - Patient presents with recurrence of plantar verruca on left foot, with associated pain and edema  - Considered conservative treatment with topical medication versus surgical intervention  - If topical treatment is ineffective, consider scheduling surgical intervention at the [place] location or in-office, pending equipment availability. Pt agrees for surgical intervention. We discussed post op recovery. Will schedule at the [facility].              Future Appointments   Date Time Provider Department Center   10/9/2024  8:00 AM Eve Noonan DPM [facility]        This note was generated with the assistance of ambient listening technology. Verbal consent was obtained by the patient and accompanying visitor(s) for the recording of patient  appointment to facilitate this note. I attest to having reviewed and edited the generated note for accuracy, though some syntax or spelling errors may persist. Please contact the author of this note for any clarification.      Report Electronically Signed By:     Eve Noonan DPM   Podiatry  [facility]  9/22/2024

## 2024-10-03 ENCOUNTER — PATIENT MESSAGE (OUTPATIENT)
Dept: PODIATRY | Facility: CLINIC | Age: 44
End: 2024-10-03
Payer: COMMERCIAL

## 2024-10-14 ENCOUNTER — PROCEDURE VISIT (OUTPATIENT)
Dept: PODIATRY | Facility: CLINIC | Age: 44
End: 2024-10-14
Payer: COMMERCIAL

## 2024-10-14 DIAGNOSIS — M79.672 CHRONIC FOOT PAIN, LEFT: ICD-10-CM

## 2024-10-14 DIAGNOSIS — B07.0 PLANTAR WART OF LEFT FOOT: Primary | ICD-10-CM

## 2024-10-14 DIAGNOSIS — G89.29 CHRONIC FOOT PAIN, LEFT: ICD-10-CM

## 2024-10-14 PROCEDURE — 88305 TISSUE EXAM BY PATHOLOGIST: CPT | Mod: 26,,, | Performed by: DERMATOLOGY

## 2024-10-14 PROCEDURE — 88305 TISSUE EXAM BY PATHOLOGIST: CPT | Performed by: DERMATOLOGY

## 2024-10-14 RX ORDER — CIMETIDINE 400 MG/1
400 TABLET, FILM COATED ORAL 2 TIMES DAILY
Qty: 60 TABLET | Refills: 3 | Status: SHIPPED | OUTPATIENT
Start: 2024-10-14 | End: 2025-10-14

## 2024-10-14 RX ORDER — IBUPROFEN 800 MG/1
800 TABLET ORAL 3 TIMES DAILY
Qty: 90 TABLET | Refills: 0 | Status: SHIPPED | OUTPATIENT
Start: 2024-10-14 | End: 2024-11-13

## 2024-10-14 RX ORDER — SILVER SULFADIAZINE 10 G/1000G
CREAM TOPICAL DAILY
Qty: 20 G | Refills: 1 | Status: SHIPPED | OUTPATIENT
Start: 2024-10-14

## 2024-10-14 RX ORDER — ESCITALOPRAM OXALATE 10 MG/1
10 TABLET, FILM COATED ORAL DAILY
COMMUNITY
Start: 2024-10-08

## 2024-10-14 RX ORDER — CEPHALEXIN 500 MG/1
500 CAPSULE ORAL EVERY 12 HOURS
Qty: 14 CAPSULE | Refills: 0 | Status: SHIPPED | OUTPATIENT
Start: 2024-10-14 | End: 2024-10-21

## 2024-10-14 NOTE — PROCEDURES
OPERATIVE NOTE  Date: 10/14/2024  Patient: Vero Morrissey  Medical Record Number: [id number]  Surgeon: Eve Noonan DPM  Preoperative Diagnosis:  Soft tissue lesion, left foot  Postoperative Diagnosis: soft tissue lesion, left foot  Surgical Procedure(s): Excision of soft tissue mass, left foot   Pathology: soft tissue lesion   Anesthesia: local injection of 5 ccs of 1:1 dilution marcaine 0.5% marcaine plain and 1% lidocaine with epinephrine   Hemostasis: none  Estimated blood loss: < 5 mL  Materials: none  Findings: consistent with diagnosis  Complications: None  Condition: Stable    After obtaining written consent, a local block was infiltrated into the left foot utilizing 5 ccs of 1:1 dilution marcaine 0.5% marcaine plain and 1% lidocaine with epinephrine . The left foot  was scrubbed, prepped and draped in the usual sterile manner.Attention was directed to the lesion sub 5th metatarsal head of left foot.  The soft tissue lesion was excised in toto appx 1 cm x 1cm x 0.5 cm in depth to level of subcutaneous tissue.   The wound was evaluated and further tissue was excised with 15 blade.  Adequate bleeding tissue was noted with granular base. The wound was copiously irrigated with normal saline. Silver nitrate was utilized in the void. The wound was  then dressed with silvadene  and a dry sterile dressing was applied.  Patient tolerated the procedure well. Local wound care instructions provided in written format and explained to the patient verbally.     Report Electronically Signed By:     Eve Noonan DPM   Podiatry  [facility]   10/14/2024

## 2024-10-14 NOTE — PATIENT INSTRUCTIONS
POST PROCEDURE INSTRUCTIONS: SKIN BIOPSY    How do I care for my biopsy site?  1. Remove gauze dressing in 48 hours.  2. Soak foot in epsom salt for 15 minutes  3. Apply silvadene ointment to wound  4. Pad with gauze and secure with kerlix dressing  5. Wear surgical shoe at all times.    Can I resume my usual activities?  Avoid exercise, bending, straining, swimming or lifting any heavy objects the day of the biopsy and the day after.  Do not soak the biopsy site in water.  In showers, wrap your foot with a plastic bag and secure with duct tape to avoid the area getting wet until cleared by Dr. Moya    How will I find out results of the biopsy?  The report of your biopsy takes 5-10 Business days. We will call you to discuss the results. We can only give you the results, but we may leave a message for you to call us with those who answer your phone or on an answering machine. If you would not like us to leave a message, please tell us so.    Will I have pain after surgery?  Most patients do not have a great deal of pain after the biopsy. If you have discomfort, you should take Tylenol® (acetaminophen) and apply ice to the area for 10 minutes.    When should I call my doctor?  Call your doctor if you have:  · Pain that gets worse a few days after the procedure  · A lot of swelling  · Bleeding which persists. If you have bleeding, apply firm pressure over the gauze pad  for 20 minutes. If it doesnt stop after 20 minutes, apply pressure again for 20 minutes.  If after 40 minutes, bleeding persists, call your doctor. It is normal to have some blood on  your gauze pad after the procedure.  · A temperature above 100.5F for 24 hours  · Redness, warmth, swelling, puslike  drainage at site.    Educational Information about: Soft Tissue Biopsy   What is a Soft Tissue Biopsy?  A soft tissue biopsy is the removal and microscopic examination of a small sample of soft tissue for diagnostic purposes. Soft tissue includes  the skin, fat, muscle, and tendons that surround, connect, or support other tissues or organs.  Soft tissue biopsies require little time or involvement from the patient. They enable the foot and ankle surgeon to reach an accurate diagnosis and determine the best treatment for the specific condition.    Conditions Identified by Soft Tissue Biopsies  Non-surgical treatment may help relieve the pain of a plantar fibroma, although it will not make the mass disappear. The foot and ankle surgeon may select one or more of the following non-surgical options:  Freckles (macules)  Benign pigmented, or colored, spots (moles or nevus)  Fungal or bacterial infections  Rashes (such as eczema or dermatitis)  Lesions related to a disease affecting the entire body (such as diabetes)  Nodular conditions (such as a ganglion cyst, lipoma, or fibroma)  Toenail conditions (onychomycosis, psoriasis)  Wart-like growths on the skin (benign keratoses)  Premalignant conditions (actinic and seborrheic keratoses)  What Does the Biopsy Involve?  A biopsy involves removal of a small piece of tissue, and takes just a few minutes. The procedure performed will depend on the tissue to be sampled. After numbing the area, the surgeon performs one of the following:  Shave biopsy. A thin piece of tissue is shaved off.  Punch biopsy. A small, round instrument removes a tiny core of tissue. Stitches may be needed.  Incisional or excisional biopsy. A piece, or the entire lesion, is removed. Stitches are often needed.  Once the sample is obtained, the surgeon sends it to a clinical laboratory so that the condition can be identified. The specimen will be examined by a pathologist who specializes in evaluating soft tissue biopsies.    After the Biopsy  Patients should follow the instructions provided by the surgeon for care of the biopsy site. If the area has stitches, an appointment will be scheduled for their removal.  It usually takes several days for the  lab results to arrive at the surgeons office. If the patient has not heard about the results after 10 days, the surgeons office should be contacted. Biopsy results, as well as additional treatment that may be required, will then be discussed.    Copyright © 2011    [employer] ([employer]), All Rights Reserved.

## 2024-10-16 LAB
FINAL PATHOLOGIC DIAGNOSIS: NORMAL
GROSS: NORMAL
Lab: NORMAL
MICROSCOPIC EXAM: NORMAL

## 2024-10-23 ENCOUNTER — OFFICE VISIT (OUTPATIENT)
Dept: PODIATRY | Facility: CLINIC | Age: 44
End: 2024-10-23
Payer: COMMERCIAL

## 2024-10-23 VITALS — WEIGHT: 188.69 LBS | HEIGHT: 63 IN | BODY MASS INDEX: 33.43 KG/M2

## 2024-10-23 DIAGNOSIS — B07.0 PLANTAR WART OF LEFT FOOT: Primary | ICD-10-CM

## 2024-10-23 DIAGNOSIS — L97.522 ULCERATED, FOOT, LEFT, WITH FAT LAYER EXPOSED: ICD-10-CM

## 2024-10-23 PROCEDURE — 99024 POSTOP FOLLOW-UP VISIT: CPT | Mod: S$GLB,,, | Performed by: PODIATRIST

## 2024-10-23 PROCEDURE — 1159F MED LIST DOCD IN RCRD: CPT | Mod: CPTII,S$GLB,, | Performed by: PODIATRIST

## 2024-10-23 PROCEDURE — 99999 PR PBB SHADOW E&M-EST. PATIENT-LVL III: CPT | Mod: PBBFAC,,, | Performed by: PODIATRIST

## 2024-10-23 RX ORDER — COLLAGENASE SANTYL 250 [ARB'U]/G
OINTMENT TOPICAL DAILY
Qty: 30 G | Refills: 0 | Status: SHIPPED | OUTPATIENT
Start: 2024-10-23

## 2024-10-23 NOTE — PROGRESS NOTES
"    PODIATRIC MEDICINE AND SURGERY      Chief Complaint   Patient presents with    Post-op Evaluation     Pt coming in for ,Plantar wart of left foot removal,  pt is not/diabetic, pt is/not on blood thinners, pt rates pain 0/10, pt wears:  PCP: Thelma Eddy MD  , Last Seen: 09/05/2024            SUBJECTIVE   Verochloe Morrissey is a 44 y.o. female status post left foot plantar wart excision , DOS 10/14/24. Pt is appx 10 days post operatively. Pain is well controlled with pain medications. Pt has been performing epsom salt soaks.  States  has kept dressing clean/dry; Pt denies fever, chills, nausea, and/or vomiting. Pt has no new complaint(s).     OBJECTIVE   Vitals:    10/23/24 1147   Weight: 85.6 kg (188 lb 11.4 oz)   Height: 5' 3" (1.6 m)   PainSc: 0-No pain       Neurovascular status - vascular status grossly intact.   Full thickness ulceration sub left fifth metatarsal head with fibrotic tissue overlying, no purulent drainage noted  Pins - N/A   Erythema - none  Edema - mild    Radiographs reviewed:   -  post op.    ASSESSMENT  1. Surgery follow-up examination    PLAN  Attempt made to inject left foot with 1% lidocaine. Pt unable to tolerate. Attempt made to debride foot, pt unable to tolerate secondary to pain.  Discussed wound bed appearance with unhealthy tissue, and pt states she has been compliant with soaking and local wound care with silvadene. Will order santyl for enyzmatic debridement, if unable to obtain will need to book in OR for operative debridement.  - Dressing - Dressing change was performed. Pt instructed to to continue with local wound care and f/u as scheduled in 1 week.  - Follow up - RTC as scheduled     Future Appointments   Date Time Provider Department Center   10/30/2024 11:15 AM Eve Noonan DPM [facility] [place]   11/13/2024 11:00 AM Eve Noonan DPM [facility] [place]       Report Electronically Signed By:     Eve Noonan DPM "   Podiatry  [facility]  10/23/2024

## 2024-10-24 DIAGNOSIS — Z09 SURGERY FOLLOW-UP EXAMINATION: Primary | ICD-10-CM

## 2024-10-28 ENCOUNTER — PATIENT MESSAGE (OUTPATIENT)
Dept: PODIATRY | Facility: CLINIC | Age: 44
End: 2024-10-28
Payer: COMMERCIAL

## 2024-10-29 RX ORDER — TRAMADOL HYDROCHLORIDE 50 MG/1
50 TABLET ORAL EVERY 6 HOURS
Qty: 15 TABLET | Refills: 0 | Status: SHIPPED | OUTPATIENT
Start: 2024-10-29

## 2024-10-30 ENCOUNTER — OFFICE VISIT (OUTPATIENT)
Dept: PODIATRY | Facility: CLINIC | Age: 44
End: 2024-10-30
Payer: COMMERCIAL

## 2024-10-30 VITALS — HEIGHT: 63 IN | BODY MASS INDEX: 33.43 KG/M2 | WEIGHT: 188.69 LBS

## 2024-10-30 DIAGNOSIS — L97.522 ULCERATED, FOOT, LEFT, WITH FAT LAYER EXPOSED: Primary | ICD-10-CM

## 2024-10-30 PROCEDURE — 99999 PR PBB SHADOW E&M-EST. PATIENT-LVL III: CPT | Mod: PBBFAC,,, | Performed by: PODIATRIST

## 2024-11-07 ENCOUNTER — PATIENT MESSAGE (OUTPATIENT)
Dept: PODIATRY | Facility: CLINIC | Age: 44
End: 2024-11-07

## 2024-11-07 ENCOUNTER — OFFICE VISIT (OUTPATIENT)
Dept: PODIATRY | Facility: CLINIC | Age: 44
End: 2024-11-07
Payer: COMMERCIAL

## 2024-11-07 VITALS — WEIGHT: 188.69 LBS | HEIGHT: 63 IN | BODY MASS INDEX: 33.43 KG/M2

## 2024-11-07 DIAGNOSIS — L97.522 ULCERATED, FOOT, LEFT, WITH FAT LAYER EXPOSED: Primary | ICD-10-CM

## 2024-11-07 DIAGNOSIS — M79.672 LEFT FOOT PAIN: ICD-10-CM

## 2024-11-07 PROCEDURE — 99999 PR PBB SHADOW E&M-EST. PATIENT-LVL III: CPT | Mod: PBBFAC,,, | Performed by: PODIATRIST

## 2024-11-07 NOTE — PROGRESS NOTES
Podiatry Department    Patient ID: Vero Morrissey is a 44 y.o. female.    Chief Complaint: Post-op Evaluation (3 week s/p of plantar wart of left foot, pt rates pain 0/10 , pt is non-diabetic)    History of Present Illness    CHIEF COMPLAINT:  - Vero presents today for follow-up of a full-thickness ulceration under the fifth metatarsal head.    HPI:  Vero presents for follow-up of a full-thickness ulceration under the fifth metatarsal head. She reports that the discharge from the wound appears brownish in color. She mentions tenderness in the area, particularly on the side of the foot. She has been compliant with SANTYL.     She denies any purulent drainage from the wound. She denies any medical diagnoses.            Physical Exam    MSK: Foot/Ankle - Right: No purulent drainage. Tenderness. Full-thickness ulceration sub fifth metatarsal head with hyperkeratotic rim.  Measuring 0.3 cm x 0.8 cm. No  acute signs of infection noted.          Diagnoses:  1. Ulcerated, foot, left, with fat layer exposed    2. Left foot pain        Assessment & Plan      PROCEDURES:  - Performed debridement of full-thickness ulceration sub 5th metatarsal head.     -Goal of treatment: Prevention of infection and wound healing  -The wound is cleansed and prepped antispetic solution. After obtaining verbal consent from patient, the ulcer on left foot was sharply excisionally debrided of viable and nonviable tissue down to good bleeding granular tissue base utilizing sterile #15/#10 blade and tissue nipper and forceps.   Debridement was excisional and included epidermal, dermal, subcutaneous tissues, without muscle and/or fascia.  Removal of all non-viable skin and soft tissues; necrotic skin/tissue formation was performed. The woundbase/wound bed was also debrided to encourage bleeding as to promote/stimulate healing. Post debridement measurements are noted in objective portion of exam.Wound was irrigated with sterile saline and  bleeding was controlled with direct pressure. Minimal blood loss. The patient tolerated this well. Wound was then dressed with ELENA. Patient was given instructions on daily dressing changes. Patient was also instructed on the importance of keeping the wound and dressings clean dry and intact and keeping pressure off the wound area until complete healing of the wound.  The patient is alerted to watch for any signs of infection (redness, pus, pain, increased swelling or fever) and call if such occurs. Home wound care instructions are provided.  -Discussed potential complications with the patient and all questions fully answered.   -RTC as scheduled, or sooner if condition worsens  Applied collagen dressing to the wound, to be kept on for 2 days. Instructed patient on wet to dry packing procedure to be started on Saturday.    FOLLOW UP:  - Follow up on Saturday to remove the collagen dressing and start wet to dry packing.           Future Appointments   Date Time Provider Department Center   11/13/2024 11:00 AM Eve Noonan DPM [facility]        This note was generated with the assistance of ambient listening technology. Verbal consent was obtained by the patient and accompanying visitor(s) for the recording of patient appointment to facilitate this note. I attest to having reviewed and edited the generated note for accuracy, though some syntax or spelling errors may persist. Please contact the author of this note for any clarification.      Report Electronically Signed By:     Eve Noonan DPM   Podiatry  [facility]   11/8/2024

## 2024-11-08 DIAGNOSIS — Z09 SURGERY FOLLOW-UP EXAMINATION: ICD-10-CM

## 2024-11-08 RX ORDER — IBUPROFEN 800 MG/1
800 TABLET ORAL 3 TIMES DAILY
Qty: 90 TABLET | Refills: 0 | Status: SHIPPED | OUTPATIENT
Start: 2024-11-08 | End: 2024-12-08

## 2024-11-08 RX ORDER — TRAMADOL HYDROCHLORIDE 50 MG/1
50 TABLET ORAL EVERY 6 HOURS
Qty: 15 TABLET | Refills: 0 | Status: SHIPPED | OUTPATIENT
Start: 2024-11-08

## 2024-11-13 ENCOUNTER — OFFICE VISIT (OUTPATIENT)
Dept: PODIATRY | Facility: CLINIC | Age: 44
End: 2024-11-13
Payer: COMMERCIAL

## 2024-11-13 VITALS — HEIGHT: 63 IN | BODY MASS INDEX: 33.43 KG/M2 | WEIGHT: 188.69 LBS

## 2024-11-13 DIAGNOSIS — M79.672 LEFT FOOT PAIN: ICD-10-CM

## 2024-11-13 DIAGNOSIS — B07.0 PLANTAR WART OF LEFT FOOT: ICD-10-CM

## 2024-11-13 DIAGNOSIS — L97.522 ULCERATED, FOOT, LEFT, WITH FAT LAYER EXPOSED: Primary | ICD-10-CM

## 2024-11-13 PROCEDURE — 99999 PR PBB SHADOW E&M-EST. PATIENT-LVL III: CPT | Mod: PBBFAC,,, | Performed by: PODIATRIST

## 2024-11-19 ENCOUNTER — PATIENT MESSAGE (OUTPATIENT)
Dept: PODIATRY | Facility: CLINIC | Age: 44
End: 2024-11-19
Payer: COMMERCIAL

## 2024-11-22 ENCOUNTER — OFFICE VISIT (OUTPATIENT)
Dept: PODIATRY | Facility: CLINIC | Age: 44
End: 2024-11-22
Payer: COMMERCIAL

## 2024-11-22 VITALS — BODY MASS INDEX: 33.43 KG/M2 | WEIGHT: 188.69 LBS | HEIGHT: 63 IN

## 2024-11-22 DIAGNOSIS — L97.522 ULCERATED, FOOT, LEFT, WITH FAT LAYER EXPOSED: Primary | ICD-10-CM

## 2024-11-22 DIAGNOSIS — M79.672 LEFT FOOT PAIN: ICD-10-CM

## 2024-11-22 PROCEDURE — 99999 PR PBB SHADOW E&M-EST. PATIENT-LVL III: CPT | Mod: PBBFAC,,, | Performed by: PODIATRIST

## 2024-11-22 PROCEDURE — 87076 CULTURE ANAEROBE IDENT EACH: CPT | Performed by: PODIATRIST

## 2024-11-22 PROCEDURE — 87075 CULTR BACTERIA EXCEPT BLOOD: CPT | Performed by: PODIATRIST

## 2024-11-22 PROCEDURE — 87070 CULTURE OTHR SPECIMN AEROBIC: CPT | Performed by: PODIATRIST

## 2024-11-22 RX ORDER — SULFAMETHOXAZOLE AND TRIMETHOPRIM 800; 160 MG/1; MG/1
1 TABLET ORAL 2 TIMES DAILY
Qty: 20 TABLET | Refills: 0 | Status: SHIPPED | OUTPATIENT
Start: 2024-11-22 | End: 2024-12-02

## 2024-11-22 NOTE — PROGRESS NOTES
Podiatry Department    Patient ID: Vero Morrissey is a 44 y.o. female.    Chief Complaint: Wound Care (Left foot wound care, pt c/o marie 3/10, non-diabetic pt wears post-op shoe, PCP Dr. Gonzales last seen 12-26-23)    History of Present Illness                 Physical Exam                Diagnoses:  1. Ulcerated, foot, left, with fat layer exposed  -     Aerobic culture  -     Culture, Anaerobic    2. Left foot pain    Other orders  -     sulfamethoxazole-trimethoprim 800-160mg (BACTRIM DS) 800-160 mg Tab; Take 1 tablet by mouth 2 (two) times daily. for 10 days  Dispense: 20 tablet; Refill: 0        Assessment & Plan          -Goal of treatment: Prevention of infection and wound healing  -The left wound is cleansed and prepped antispetic solution. After obtaining verbal consent from patient, the ulcer was sharply excisionally debrided of viable and nonviable tissue down to good bleeding granular tissue base utilizing sterile #15/#10 blade and tissue nipper and forceps.   Debridement was excisional and included epidermal, dermal, subcutaneous tissues, without muscle and/or fascia.  Removal of all non-viable skin and soft tissues; necrotic skin/tissue formation was performed. The woundbase/wound bed was also debrided to encourage bleeding as to promote/stimulate healing. Wound measurements are noted in objective exam. Wound was irrigated with sterile saline and bleeding was controlled with direct pressure. Deep wound cultures taken. Minimal blood loss. The patient tolerated this well. Wound was then dressed with wet to dry packing. Patient was given instructions on daily dressing changes. Patient was also instructed on the importance of keeping the wound and dressings clean dry and intact and keeping pressure off the wound area until complete healing of the wound.  The patient is alerted to watch for any signs of infection (redness, pus, pain, increased swelling or fever) and call if such occurs. Home wound  care instructions are provided.  -Discussed potential complications with the patient and all questions fully answered.   -RTC as scheduled, or sooner if condition worsens           Future Appointments   Date Time Provider Department Center   11/29/2024  1:15 PM Eve Noonan DPM [facility]        This note was generated with the assistance of ambient listening technology. Verbal consent was obtained by the patient and accompanying visitor(s) for the recording of patient appointment to facilitate this note. I attest to having reviewed and edited the generated note for accuracy, though some syntax or spelling errors may persist. Please contact the author of this note for any clarification.      Report Electronically Signed By:     Eve Noonan DPM   Podiatry  [facility]-   11/22/2024

## 2024-11-22 NOTE — PROGRESS NOTES
Podiatry Department    Patient ID: Vero Morrissey is a 44 y.o. female.    Chief Complaint: Post-op Evaluation (Post op: pt pain rate 4/10, pt is nondiabetic, no draining at present)    History of Present Illness    CHIEF COMPLAINT:  - Vero presents today for follow-up left foot ulcer     HPI:  Vero presents for follow-up left foot ulcer. She has been compliant with at home wound care with wet to dry packing. Pain has improved to site.  ahe denies any purulent drainage from the wound.          Physical Exam      Left foot ulcer sub 5th metatarsal head Measuring 0.3 cm x 0.5 cm. No  acute signs of infection noted.          Diagnoses:  1. Ulcerated, foot, left, with fat layer exposed    2. Left foot pain    3. Plantar wart of left foot          Assessment & Plan      PROCEDURES:  - Performed debridement of full-thickness ulceration sub 5th metatarsal head.     -Goal of treatment: Prevention of infection and wound healing  -The wound is cleansed and prepped antispetic solution. After obtaining verbal consent from patient, the ulcer on left foot was sharply excisionally debrided of viable and nonviable tissue down to good bleeding granular tissue base utilizing sterile #15/#10 blade and tissue nipper and forceps.   Debridement was excisional and included epidermal, dermal, subcutaneous tissues, without muscle and/or fascia.  Removal of all non-viable skin and soft tissues; necrotic skin/tissue formation was performed. The woundbase/wound bed was also debrided to encourage bleeding as to promote/stimulate healing. Post debridement measurements are noted in objective portion of exam.Wound was irrigated with sterile saline and bleeding was controlled with direct pressure. Minimal blood loss. The patient tolerated this well. Wound was then dressed with wet to dry packing. Patient was given instructions on daily dressing changes. Patient was also instructed on the importance of keeping the wound and dressings clean  dry and intact and keeping pressure off the wound area until complete healing of the wound.  The patient is alerted to watch for any signs of infection (redness, pus, pain, increased swelling or fever) and call if such occurs. Home wound care instructions are provided.  -Discussed potential complications with the patient and all questions fully answered.   -RTC as scheduled, or sooner if condition worsens    FOLLOW UP:  - Follow up on Saturday to remove the collagen dressing and start wet to dry packing.           Future Appointments   Date Time Provider Department Center   11/22/2024  1:30 PM Eve Noonan DPM [facility]        This note was generated with the assistance of ambient listening technology. Verbal consent was obtained by the patient and accompanying visitor(s) for the recording of patient appointment to facilitate this note. I attest to having reviewed and edited the generated note for accuracy, though some syntax or spelling errors may persist. Please contact the author of this note for any clarification.      Report Electronically Signed By:     Eve Noonan DPM   Podiatry  [facility]-   11/22/2024

## 2024-11-25 LAB — BACTERIA SPEC AEROBE CULT: NORMAL

## 2024-11-27 LAB — BACTERIA SPEC ANAEROBE CULT: ABNORMAL

## 2024-11-29 ENCOUNTER — OFFICE VISIT (OUTPATIENT)
Dept: PODIATRY | Facility: CLINIC | Age: 44
End: 2024-11-29
Payer: COMMERCIAL

## 2024-11-29 VITALS — BODY MASS INDEX: 33.43 KG/M2 | WEIGHT: 188.69 LBS | HEIGHT: 63 IN

## 2024-11-29 DIAGNOSIS — B07.0 PLANTAR WART OF LEFT FOOT: ICD-10-CM

## 2024-11-29 DIAGNOSIS — L97.522 ULCERATED, FOOT, LEFT, WITH FAT LAYER EXPOSED: Primary | ICD-10-CM

## 2024-11-29 PROCEDURE — 99999 PR PBB SHADOW E&M-EST. PATIENT-LVL III: CPT | Mod: PBBFAC,,, | Performed by: PODIATRIST

## 2024-11-29 RX ORDER — METRONIDAZOLE 500 MG/1
500 TABLET ORAL EVERY 12 HOURS
Qty: 20 TABLET | Refills: 0 | Status: SHIPPED | OUTPATIENT
Start: 2024-11-29 | End: 2024-12-09

## 2024-11-29 NOTE — LETTER
November 29, 2024      [facility] Podiat[facility]  [address] [place] [address]  Phone: [phone]       Patient: Vero Morrissey   YOB: 1980  Date of Visit: 11/29/2024    To Whom It May Concern:    Julian Morrissey  was at [facility] on 11/29/2024. The patient may return to work on 12.9.24 with no restrictions. If you have any questions or concerns, or if I can be of further assistance, please do not hesitate to contact me.    Sincerely,      Lisa Shane MA

## 2024-11-29 NOTE — PROGRESS NOTES
Podiatry Department    Patient ID: Vero Morrissey is a 44 y.o. female.    Chief Complaint: Wound Care (Left foot wound care: pt rates 4/10 pain, pt is nondiabetic, no draining or swelling )    History of Present Illness    CHIEF COMPLAINT:  - Vero presents today for a 1-week follow-up visit for left foot wound care.    HPI:  Vero presents for a 1-week follow-up visit for left foot wound care. She reports compliance with the previously provided instructions for local wound care. A culture was taken during the last visit, which returned positive for fingolimod. She has been taking oral antibiotics as prescribed. Wound is healed.    MEDICATIONS:  - Oral antibiotics: as prescribed          Physical Exam    MSK: Foot/Ankle - Left: No open wound area sub left 1st metatarsal. No acute signs of infection noted.           Diagnoses:  1. Ulcerated, foot, left, healed    2. Plantar wart of left foot    Other orders  -     metroNIDAZOLE (FLAGYL) 500 MG tablet; Take 1 tablet (500 mg total) by mouth every 12 (twelve) hours. for 10 days  Dispense: 20 tablet; Refill: 0          Assessment & Plan      MEDICATIONS PRESCRIBED:  - Prescribed Flagyl for 10 days to treat the culture.    RETURN TO ACTIVITY:  - Wear thick socks and surgical shoe for 1 week and then transition to tennis shoes. No barefoot walking.    FOLLOW UP:  - Follow up as needed.        No future appointments.       This note was generated with the assistance of ambient listening technology. Verbal consent was obtained by the patient and accompanying visitor(s) for the recording of patient appointment to facilitate this note. I attest to having reviewed and edited the generated note for accuracy, though some syntax or spelling errors may persist. Please contact the author of this note for any clarification.      Report Electronically Signed By:     Eve Noonan DPM   Podiatry  [facility]  11/29/2024

## 2024-12-09 ENCOUNTER — PATIENT MESSAGE (OUTPATIENT)
Dept: PODIATRY | Facility: CLINIC | Age: 44
End: 2024-12-09
Payer: COMMERCIAL

## 2024-12-12 ENCOUNTER — OFFICE VISIT (OUTPATIENT)
Dept: PODIATRY | Facility: CLINIC | Age: 44
End: 2024-12-12
Payer: COMMERCIAL

## 2024-12-12 DIAGNOSIS — Q82.8 POROKERATOSIS: Primary | ICD-10-CM

## 2024-12-12 DIAGNOSIS — M79.672 LEFT FOOT PAIN: ICD-10-CM

## 2024-12-12 DIAGNOSIS — L84 CALLUS OF FOOT: ICD-10-CM

## 2024-12-12 PROCEDURE — 99999 PR PBB SHADOW E&M-EST. PATIENT-LVL III: CPT | Mod: PBBFAC,,, | Performed by: PODIATRIST

## 2024-12-12 NOTE — PROGRESS NOTES
Subjective:       Patient ID: Vero Morrissey is a 44 y.o. female.    Chief Complaint: Foot Pain (Patient complains of 3/10 pain at present to left plantar foot. Previous PW removal. Swelling and pain present. )    HPI: Vero Morrissey presents to the office today, with areas of concern to the plantar aspect of the left foot.  States history of previous planter's wart which has been excised.  She has been on multiple rounds of medications for this.  She has not antibiotics.  States 3/10 pain.  Currently wearing winter style boot.  States that it feels as if walking on rocks.  Denies any puncture wounds or injuries.  States this pain is been ongoing for some time without significant improvement.  No specific memory of walking barefoot or causing injury.  States visualization of new developed lesions to the foot.  No signs of acute infection.    Review of patient's allergies indicates:  No Known Allergies    Past Medical History:   Diagnosis Date    ADD (attention deficit disorder)      Family History   Problem Relation Name Age of Onset    Hypertension Mother      Diabetes Mother      Hypertension Father      Diabetes Father      Cancer Brother          lymphoma     Social History     Socioeconomic History    Marital status:    Tobacco Use    Smoking status: Every Day     Current packs/day: 1.00     Average packs/day: 1 pack/day for 24.9 years (24.9 ttl pk-yrs)     Types: Cigarettes     Start date: 2000     Passive exposure: Never   Substance and Sexual Activity    Alcohol use: Yes     Comment: socially    Drug use: No    Sexual activity: Yes     Partners: Male     Past Surgical History:   Procedure Laterality Date    COLONOSCOPY N/A 2/7/2024    Procedure: COLONOSCOPY;  Surgeon: Marc Rollins MD;  Location: [facility];  Service: Endoscopy;  Laterality: N/A;    tennis elbow surgery Left     tubligation Bilateral 2022     Review of Systems    Objective:   There were no vitals taken for this  visit.    X-Ray Hand 3 view Right  Narrative: 3 views of the right hand    Comparison: None.    Findings: There is no evidence to suggest acute fracture or dislocation.  The joint spaces appear to be well-maintained.  Impression:  As above    Electronically signed by: BOB CRUMP D.O.  Date:     04/25/17  Time:    08:14        Physical Exam   LOWER EXTREMITY PHYSICAL EXAMINATION    Vascular:  The right dorsalis pedis pulse 2/4 and the right posterior tibial pulse 2/4.  The left dorsalis pedis pulse 2/4 and posterior tibial pulse on the left is 2/4.  Capillary refill is intact.  Pedal hair growth intact    Neurological:  Sharp/dull, light touch, proprioception all intact and equal bilaterally.  Vibratory sensation is intact.  Protective sensation intact    Musculoskeletal: Manual Muscle Testing is 5/5 with dorsiflexion, plantar flexion, abduction, and adduction.   There is normal range of motion in the forefoot, hindfoot, and Ankle joint.       Dermatological:  Skin is supple and moist.  There is 1 lesion present to the plantar aspect of the foot which have a resemblance to a plantar porokeratosis note to the left foot.  Centralize core is noted.  There is no acute signs of infection.  No signs of underlying ulceration.  There is no absence of skin line.  No obvious capillary hemorrhaging.        Assessment:     1. Porokeratosis    2. Left foot pain    3. Callus of foot      Plan:     Porokeratosis    Left foot pain    Callus of foot      We discussed the etiology and pathology associated with acquired plantar porokeratosis.  We discussed the importance of removing the centralize core and alleviating pain discomfort.  We also discussed utilizing a pumice stone at home to reduce callus formation and subsequent recurrence of this area.  Recommend to apply hydrating creams and lotions this area daily to ensure that there is no subsequent buildup.    Porokeratotic skin formation, as outlined within the examination  portion of this note, is surgically debrided with sharp #10/#15 blade, to alleviate discomfort with weight bearing and ambulation, and to lessen the possibility of skin complications, e.g., ulceration due to pressure. No ulceration(s) is are noted with/post debridement. The lesion is completed healed and resolved. No evidence of infection.     No future appointments.

## 2025-08-29 ENCOUNTER — OFFICE VISIT (OUTPATIENT)
Dept: PODIATRY | Facility: CLINIC | Age: 45
End: 2025-08-29
Payer: COMMERCIAL

## 2025-08-29 DIAGNOSIS — M79.672 LEFT FOOT PAIN: Primary | ICD-10-CM

## 2025-08-29 DIAGNOSIS — Q82.8 POROKERATOSIS: ICD-10-CM

## 2025-08-29 PROCEDURE — 99999 PR PBB SHADOW E&M-EST. PATIENT-LVL II: CPT | Mod: PBBFAC,,, | Performed by: PODIATRIST
